# Patient Record
Sex: FEMALE | Race: WHITE | NOT HISPANIC OR LATINO | URBAN - METROPOLITAN AREA
[De-identification: names, ages, dates, MRNs, and addresses within clinical notes are randomized per-mention and may not be internally consistent; named-entity substitution may affect disease eponyms.]

---

## 2024-09-10 ENCOUNTER — HOSPITAL ENCOUNTER (OUTPATIENT)
Dept: RADIOLOGY | Facility: EXTERNAL LOCATION | Age: 79
Discharge: HOME | End: 2024-09-10

## 2024-09-16 ENCOUNTER — OFFICE VISIT (OUTPATIENT)
Dept: NEUROSURGERY | Facility: HOSPITAL | Age: 79
End: 2024-09-16
Payer: COMMERCIAL

## 2024-09-16 VITALS
HEART RATE: 66 BPM | WEIGHT: 104.06 LBS | RESPIRATION RATE: 17 BRPM | HEIGHT: 60 IN | BODY MASS INDEX: 20.43 KG/M2 | SYSTOLIC BLOOD PRESSURE: 130 MMHG | TEMPERATURE: 98.1 F | DIASTOLIC BLOOD PRESSURE: 71 MMHG

## 2024-09-16 DIAGNOSIS — I67.1 CEREBRAL ANEURYSM (HHS-HCC): Primary | ICD-10-CM

## 2024-09-16 PROCEDURE — 99202 OFFICE O/P NEW SF 15 MIN: CPT | Performed by: NURSE PRACTITIONER

## 2024-09-16 PROCEDURE — 99212 OFFICE O/P EST SF 10 MIN: CPT | Performed by: NURSE PRACTITIONER

## 2024-09-16 ASSESSMENT — ENCOUNTER SYMPTOMS
LOSS OF SENSATION IN FEET: 0
OCCASIONAL FEELINGS OF UNSTEADINESS: 1
DEPRESSION: 0

## 2024-09-16 NOTE — PROGRESS NOTES
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 Coiling was completed 27 years ago at [facility]. Last vascular imaging was completed in 2002 (read only) where she was noted to have coil artifact with lack of flow related enhancement in L ICA but noted a 1cm segment of flow in proximal ICA and 1.5cm segment flow in terminal ICA. No additional aneurysm noted at that time. She was recently evaluated by Dr. Negro for an elevate PTH of 135. Patient was recommended for CT/CTA imaging noted a parathyroid adenoma as well as a 5-6mm acomm aneurysm. She was recommended for neck exploration with probable removal of adenoma. Patient was referred to neurosurgery for surgical clearance and evaluation for acomm aneurysm. She presents to clinic for NPV.     Familial history of aneurysm: Brother passed from ruptured aneurysm.     Former smoker but quit over 50 years ago. BP controlled on current regimen. Intermittent headaches but are rare and only last 15-20 minutes. Denies any other neurological complaints such as blurred or double vision, seizures, or dizziness.         Objective      Vitals:   /71 (BP Location: Left arm, Patient Position: Sitting, BP Cuff Size: Adult)   Pulse 66   Temp 36.7 °C (98.1 °F)   Resp 17   Ht 1.524 m (5')   Wt 47.2 kg (104 lb 0.9 oz)   BMI 20.32 kg/m²         Physical Exam:  A&Ox3   Fluent speech   EOMI; FC x 4   MOFFETT; strength 5/5; no drift   Face and shoulder shrug symmetrical   SILT   Tongue midline   Gait slow, steady, use of a cane         Relevant Results:    CTA 08/22/24: 5mm acomm outpouching. Limited evaluated of L ICA by metal artifact.         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 Found to have 5mm acomm outpouching while undergoing workup for parathyroid adenoma. Known familial history of cerebral aneurysm with brother who passed from rupture.     I discussed the natural history of cerebral aneurysm and reviewed modifiable risk factors of ongoing BP management. She is a former smoker over 50 years ago. Given size and location will have case presented at CV conference due to patients age and need for surgical clearance.     I will call patient post conference with recommendations. All questions answered.     Medical History     No past medical history on file.  Past Surgical History:   Procedure Laterality Date    APPENDECTOMY  11/18/2016    Appendectomy    HYSTERECTOMY  11/18/2016    Hysterectomy    ROTATOR CUFF REPAIR  11/18/2016    Rotator Cuff Repair        No family history on file.  Not on File  No current outpatient medications

## 2024-09-18 ENCOUNTER — MULTIDISCIPLINARY MEETING (OUTPATIENT)
Dept: NEUROSURGERY | Facility: HOSPITAL | Age: 79
End: 2024-09-18
Payer: COMMERCIAL

## 2024-09-18 DIAGNOSIS — I67.1 CEREBRAL ANEURYSM (HHS-HCC): Primary | ICD-10-CM

## 2024-09-18 DIAGNOSIS — Z79.1 ENCOUNTER FOR MONITORING CHRONIC NSAID THERAPY: ICD-10-CM

## 2024-09-18 DIAGNOSIS — Z51.81 ENCOUNTER FOR MONITORING CHRONIC NSAID THERAPY: ICD-10-CM

## 2024-10-01 NOTE — PROGRESS NOTES
Cerebrovascular Conference 09/18/24    Case Review: PMH h/o back pain, osteoporosis, HTN, HLD, aortic valve sclerosis, GERD, colitis, hyperparathyroidism, parathyroid adenoma, 27 years ago had coiling of L opthalmic artery aneurysm and L ICA occlusion coils, on additional workup and clearance for neck exploration and removal of parathyroid adeoma was found to have Acomm aneurysm.     Familial history of rupture aneurysm - brother     Recommendations:   Angiogram for further evaluation of aneurysm. Ok to undergo surgical procedure for parathyroid adenoma.       Cerebrovascular conference is a multidisciplinary conferences attended by neurosurgery, neuro-radiology, APPs, and Rns.

## 2024-10-07 ENCOUNTER — LAB (OUTPATIENT)
Dept: LAB | Facility: LAB | Age: 79
End: 2024-10-07
Payer: COMMERCIAL

## 2024-10-07 DIAGNOSIS — Z51.81 ENCOUNTER FOR MONITORING CHRONIC NSAID THERAPY: ICD-10-CM

## 2024-10-07 DIAGNOSIS — Z79.1 ENCOUNTER FOR MONITORING CHRONIC NSAID THERAPY: ICD-10-CM

## 2024-10-07 DIAGNOSIS — I67.1 CEREBRAL ANEURYSM (HHS-HCC): ICD-10-CM

## 2024-10-07 LAB
ALBUMIN SERPL BCP-MCNC: 4.4 G/DL (ref 3.4–5)
ANION GAP SERPL CALC-SCNC: 11 MMOL/L (ref 10–20)
APTT PPP: 33 SECONDS (ref 27–38)
BUN SERPL-MCNC: 11 MG/DL (ref 6–23)
CALCIUM SERPL-MCNC: 10.1 MG/DL (ref 8.6–10.3)
CHLORIDE SERPL-SCNC: 106 MMOL/L (ref 98–107)
CO2 SERPL-SCNC: 28 MMOL/L (ref 21–32)
CREAT SERPL-MCNC: 0.52 MG/DL (ref 0.5–1.05)
EGFRCR SERPLBLD CKD-EPI 2021: >90 ML/MIN/1.73M*2
ERYTHROCYTE [DISTWIDTH] IN BLOOD BY AUTOMATED COUNT: 14.5 % (ref 11.5–14.5)
GLUCOSE SERPL-MCNC: 85 MG/DL (ref 74–99)
HCT VFR BLD AUTO: 43.1 % (ref 36–46)
HGB BLD-MCNC: 13.2 G/DL (ref 12–16)
INR PPP: 1 (ref 0.9–1.1)
MCH RBC QN AUTO: 29.9 PG (ref 26–34)
MCHC RBC AUTO-ENTMCNC: 30.6 G/DL (ref 32–36)
MCV RBC AUTO: 98 FL (ref 80–100)
NRBC BLD-RTO: 0 /100 WBCS (ref 0–0)
PHOSPHATE SERPL-MCNC: 3.7 MG/DL (ref 2.5–4.9)
PLATELET # BLD AUTO: 321 X10*3/UL (ref 150–450)
POTASSIUM SERPL-SCNC: 4.3 MMOL/L (ref 3.5–5.3)
PROTHROMBIN TIME: 11.4 SECONDS (ref 9.8–12.8)
RBC # BLD AUTO: 4.42 X10*6/UL (ref 4–5.2)
SODIUM SERPL-SCNC: 141 MMOL/L (ref 136–145)
WBC # BLD AUTO: 9 X10*3/UL (ref 4.4–11.3)

## 2024-10-07 PROCEDURE — 85730 THROMBOPLASTIN TIME PARTIAL: CPT

## 2024-10-07 PROCEDURE — 85610 PROTHROMBIN TIME: CPT

## 2024-10-07 PROCEDURE — 36415 COLL VENOUS BLD VENIPUNCTURE: CPT

## 2024-10-07 PROCEDURE — 85027 COMPLETE CBC AUTOMATED: CPT

## 2024-10-07 PROCEDURE — 80069 RENAL FUNCTION PANEL: CPT

## 2024-10-16 ENCOUNTER — HOSPITAL ENCOUNTER (OUTPATIENT)
Dept: RADIOLOGY | Facility: HOSPITAL | Age: 79
Discharge: HOME | End: 2024-10-16
Payer: COMMERCIAL

## 2024-10-16 VITALS
DIASTOLIC BLOOD PRESSURE: 68 MMHG | TEMPERATURE: 98.1 F | OXYGEN SATURATION: 98 % | HEART RATE: 65 BPM | RESPIRATION RATE: 17 BRPM | SYSTOLIC BLOOD PRESSURE: 125 MMHG

## 2024-10-16 DIAGNOSIS — I67.1 CEREBRAL ANEURYSM (HHS-HCC): ICD-10-CM

## 2024-10-16 PROCEDURE — 99152 MOD SED SAME PHYS/QHP 5/>YRS: CPT | Performed by: STUDENT IN AN ORGANIZED HEALTH CARE EDUCATION/TRAINING PROGRAM

## 2024-10-16 PROCEDURE — 2500000004 HC RX 250 GENERAL PHARMACY W/ HCPCS (ALT 636 FOR OP/ED): Performed by: STUDENT IN AN ORGANIZED HEALTH CARE EDUCATION/TRAINING PROGRAM

## 2024-10-16 PROCEDURE — 36222 PLACE CATH CAROTID/INOM ART: CPT | Mod: 50 | Performed by: STUDENT IN AN ORGANIZED HEALTH CARE EDUCATION/TRAINING PROGRAM

## 2024-10-16 PROCEDURE — 96373 THER/PROPH/DIAG INJ IA: CPT | Performed by: STUDENT IN AN ORGANIZED HEALTH CARE EDUCATION/TRAINING PROGRAM

## 2024-10-16 PROCEDURE — C1760 CLOSURE DEV, VASC: HCPCS | Performed by: STUDENT IN AN ORGANIZED HEALTH CARE EDUCATION/TRAINING PROGRAM

## 2024-10-16 PROCEDURE — C1769 GUIDE WIRE: HCPCS | Performed by: STUDENT IN AN ORGANIZED HEALTH CARE EDUCATION/TRAINING PROGRAM

## 2024-10-16 PROCEDURE — 36225 PLACE CATH SUBCLAVIAN ART: CPT

## 2024-10-16 PROCEDURE — 36226 PLACE CATH VERTEBRAL ART: CPT | Performed by: STUDENT IN AN ORGANIZED HEALTH CARE EDUCATION/TRAINING PROGRAM

## 2024-10-16 PROCEDURE — 76377 3D RENDER W/INTRP POSTPROCES: CPT | Performed by: STUDENT IN AN ORGANIZED HEALTH CARE EDUCATION/TRAINING PROGRAM

## 2024-10-16 PROCEDURE — 99153 MOD SED SAME PHYS/QHP EA: CPT | Performed by: STUDENT IN AN ORGANIZED HEALTH CARE EDUCATION/TRAINING PROGRAM

## 2024-10-16 PROCEDURE — 2550000001 HC RX 255 CONTRASTS: Performed by: STUDENT IN AN ORGANIZED HEALTH CARE EDUCATION/TRAINING PROGRAM

## 2024-10-16 PROCEDURE — 7100000009 HC PHASE TWO TIME - INITIAL BASE CHARGE: Performed by: STUDENT IN AN ORGANIZED HEALTH CARE EDUCATION/TRAINING PROGRAM

## 2024-10-16 PROCEDURE — 2720000007 HC OR 272 NO HCPCS: Performed by: STUDENT IN AN ORGANIZED HEALTH CARE EDUCATION/TRAINING PROGRAM

## 2024-10-16 PROCEDURE — 7100000010 HC PHASE TWO TIME - EACH INCREMENTAL 1 MINUTE: Performed by: STUDENT IN AN ORGANIZED HEALTH CARE EDUCATION/TRAINING PROGRAM

## 2024-10-16 PROCEDURE — 2780000003 HC OR 278 NO HCPCS: Performed by: STUDENT IN AN ORGANIZED HEALTH CARE EDUCATION/TRAINING PROGRAM

## 2024-10-16 PROCEDURE — 75710 ARTERY X-RAYS ARM/LEG: CPT | Mod: RT | Performed by: STUDENT IN AN ORGANIZED HEALTH CARE EDUCATION/TRAINING PROGRAM

## 2024-10-16 RX ORDER — ACETAMINOPHEN 160 MG/5ML
650 SOLUTION ORAL EVERY 4 HOURS PRN
Status: DISCONTINUED | OUTPATIENT
Start: 2024-10-16 | End: 2024-10-17 | Stop reason: HOSPADM

## 2024-10-16 RX ORDER — FENTANYL CITRATE 50 UG/ML
INJECTION, SOLUTION INTRAMUSCULAR; INTRAVENOUS
Status: COMPLETED | OUTPATIENT
Start: 2024-10-16 | End: 2024-10-16

## 2024-10-16 RX ORDER — ACETAMINOPHEN 325 MG/1
650 TABLET ORAL EVERY 4 HOURS PRN
Status: DISCONTINUED | OUTPATIENT
Start: 2024-10-16 | End: 2024-10-17 | Stop reason: HOSPADM

## 2024-10-16 RX ORDER — MIDAZOLAM HYDROCHLORIDE 1 MG/ML
INJECTION INTRAMUSCULAR; INTRAVENOUS
Status: COMPLETED | OUTPATIENT
Start: 2024-10-16 | End: 2024-10-16

## 2024-10-16 RX ORDER — ACETAMINOPHEN 650 MG/1
650 SUPPOSITORY RECTAL EVERY 4 HOURS PRN
Status: DISCONTINUED | OUTPATIENT
Start: 2024-10-16 | End: 2024-10-17 | Stop reason: HOSPADM

## 2024-10-16 ASSESSMENT — PAIN SCALES - GENERAL
PAINLEVEL_OUTOF10: 0 - NO PAIN
PAINLEVEL_OUTOF10: 4
PAINLEVEL_OUTOF10: 0 - NO PAIN

## 2024-10-16 ASSESSMENT — PAIN - FUNCTIONAL ASSESSMENT
PAIN_FUNCTIONAL_ASSESSMENT: 0-10

## 2024-10-16 NOTE — PRE-PROCEDURE NOTE
Pre-Procedure H&P     Provider Assessment:  Diagnosis/Reason for Procedure: Cerebral Aneurysm   Procedure: Diagnostic Cerebral Angiogram  Medications Reviewed:   yes   Prophylatic Antibiotics Needed:   no    Neuro status: A&Ox3, moving all extremities full strength   Mouth Opening OK: yes   Neck Flexibility OK: yes   Sedation Plan: moderate sedation   COVID-19 Risk Consent:  Surgeon has reviewed key risks related to the risk of rakan COVID-19 and if they contract COVID-19 what the risks are.

## 2024-10-16 NOTE — Clinical Note
Patient called advised by pharmacy,ergocalciferol (VITAMIN D2) 50,000 units , and etodolac (LODINE) 400 MG tablet Dr. Hampton  must sign off on the prescriptions. Patient request a refill of Both medications. Please advise Patient at [phone], if and when prescriptions have been to submitted to patients pharmacy [id number] - [place]  [address]    The site was marked. Prepped: right groin. Prepped with: ChloraPrep. The patient was draped.

## 2024-10-16 NOTE — PROCEDURES
Pre-Procedure Verification and Time Out:  Procedure Location: procedure area  HUDDLE - Pre-procedure Verification:  completed  TIME OUT - Final Verification:  completed immediately prior to procedure start  DEBRIEF: completed    Complications:  None; patient tolerated the procedure well.     Disposition: rPCU  Condition: stable  Specimens Collected: No specimens collected    General Information:   Anesthesia/ sedation: Non-Anesthesia  Indication(s)/Pre - Procedure Diagnoses: intracranial aneurysm  Post-Procedure Diagnosis: same  Procedure Name: Diagnostic Cerebral Angiogram  Procedure performed by: Dr. Helen Jamil  Assistant(s): Alexandru Castellon  Estimated Blood Loss (mL): 10  Specimen: no  Informed Consent: consent obtained and in chart     Access: 5 Fr Sheath in R CFA  Closure: Mynx  Vessels Injected: L subclavian, R subclavian, L CCA, R CCA, R CFA  Moderate Sedation Time: 45min  Findings: 5.5mm acomm aneurysm. Previously coiled L paraophthalmic ICA aneurysm with no residual filling, coil sacrifice of L ICA with complete occlusion distally. Full report to follow in PACS dictation

## 2024-10-23 ENCOUNTER — MULTIDISCIPLINARY MEETING (OUTPATIENT)
Dept: NEUROSURGERY | Facility: HOSPITAL | Age: 79
End: 2024-10-23
Payer: COMMERCIAL

## 2024-10-28 ENCOUNTER — OFFICE VISIT (OUTPATIENT)
Dept: NEUROSURGERY | Facility: HOSPITAL | Age: 79
End: 2024-10-28
Payer: COMMERCIAL

## 2024-10-28 VITALS
RESPIRATION RATE: 15 BRPM | WEIGHT: 104.06 LBS | HEIGHT: 60 IN | BODY MASS INDEX: 20.43 KG/M2 | SYSTOLIC BLOOD PRESSURE: 153 MMHG | DIASTOLIC BLOOD PRESSURE: 66 MMHG | HEART RATE: 63 BPM | TEMPERATURE: 97.3 F

## 2024-10-28 DIAGNOSIS — I67.1 CEREBRAL ANEURYSM (HHS-HCC): Primary | ICD-10-CM

## 2024-10-28 PROCEDURE — 99214 OFFICE O/P EST MOD 30 MIN: CPT | Performed by: NEUROLOGICAL SURGERY

## 2024-10-28 PROCEDURE — 1126F AMNT PAIN NOTED NONE PRSNT: CPT | Performed by: NEUROLOGICAL SURGERY

## 2024-10-28 PROCEDURE — 99214 OFFICE O/P EST MOD 30 MIN: CPT | Mod: 57 | Performed by: NEUROLOGICAL SURGERY

## 2024-10-28 ASSESSMENT — PAIN SCALES - GENERAL: PAINLEVEL_OUTOF10: 0-NO PAIN

## 2024-11-11 ENCOUNTER — PREP FOR PROCEDURE (OUTPATIENT)
Dept: NEUROSURGERY | Facility: HOSPITAL | Age: 79
End: 2024-11-11
Payer: COMMERCIAL

## 2024-11-11 DIAGNOSIS — I67.1 CEREBRAL ANEURYSM (HHS-HCC): Primary | ICD-10-CM

## 2024-11-22 ENCOUNTER — PRE-ADMISSION TESTING (OUTPATIENT)
Dept: PREADMISSION TESTING | Facility: HOSPITAL | Age: 79
End: 2024-11-22
Payer: COMMERCIAL

## 2024-11-22 VITALS
WEIGHT: 98 LBS | RESPIRATION RATE: 16 BRPM | DIASTOLIC BLOOD PRESSURE: 61 MMHG | TEMPERATURE: 97.3 F | BODY MASS INDEX: 21.14 KG/M2 | SYSTOLIC BLOOD PRESSURE: 150 MMHG | HEART RATE: 60 BPM | HEIGHT: 57 IN

## 2024-11-22 DIAGNOSIS — I67.1 CEREBRAL ANEURYSM (HHS-HCC): Primary | ICD-10-CM

## 2024-11-22 LAB
ABO GROUP (TYPE) IN BLOOD: NORMAL
ANION GAP SERPL CALC-SCNC: 14 MMOL/L (ref 10–20)
ANTIBODY SCREEN: NORMAL
BUN SERPL-MCNC: 8 MG/DL (ref 6–23)
CALCIUM SERPL-MCNC: 9.9 MG/DL (ref 8.6–10.6)
CHLORIDE SERPL-SCNC: 107 MMOL/L (ref 98–107)
CO2 SERPL-SCNC: 25 MMOL/L (ref 21–32)
CREAT SERPL-MCNC: 0.55 MG/DL (ref 0.5–1.05)
EGFRCR SERPLBLD CKD-EPI 2021: >90 ML/MIN/1.73M*2
ERYTHROCYTE [DISTWIDTH] IN BLOOD BY AUTOMATED COUNT: 13.5 % (ref 11.5–14.5)
GLUCOSE SERPL-MCNC: 89 MG/DL (ref 74–99)
HCT VFR BLD AUTO: 42.6 % (ref 36–46)
HGB BLD-MCNC: 13.1 G/DL (ref 12–16)
MCH RBC QN AUTO: 29.8 PG (ref 26–34)
MCHC RBC AUTO-ENTMCNC: 30.8 G/DL (ref 32–36)
MCV RBC AUTO: 97 FL (ref 80–100)
NRBC BLD-RTO: 0 /100 WBCS (ref 0–0)
PLATELET # BLD AUTO: 299 X10*3/UL (ref 150–450)
POTASSIUM SERPL-SCNC: 4.7 MMOL/L (ref 3.5–5.3)
RBC # BLD AUTO: 4.4 X10*6/UL (ref 4–5.2)
RH FACTOR (ANTIGEN D): NORMAL
SODIUM SERPL-SCNC: 141 MMOL/L (ref 136–145)
WBC # BLD AUTO: 7.5 X10*3/UL (ref 4.4–11.3)

## 2024-11-22 PROCEDURE — 36415 COLL VENOUS BLD VENIPUNCTURE: CPT

## 2024-11-22 PROCEDURE — 99204 OFFICE O/P NEW MOD 45 MIN: CPT | Performed by: NURSE PRACTITIONER

## 2024-11-22 PROCEDURE — 86901 BLOOD TYPING SEROLOGIC RH(D): CPT

## 2024-11-22 PROCEDURE — 82374 ASSAY BLOOD CARBON DIOXIDE: CPT

## 2024-11-22 PROCEDURE — 87081 CULTURE SCREEN ONLY: CPT

## 2024-11-22 PROCEDURE — 85027 COMPLETE CBC AUTOMATED: CPT

## 2024-11-22 PROCEDURE — 86923 COMPATIBILITY TEST ELECTRIC: CPT

## 2024-11-22 RX ORDER — ATORVASTATIN CALCIUM 10 MG/1
10 TABLET, FILM COATED ORAL DAILY
Status: ON HOLD | COMMUNITY

## 2024-11-22 RX ORDER — FAMOTIDINE 20 MG/1
20 TABLET, FILM COATED ORAL 2 TIMES DAILY
Status: ON HOLD | COMMUNITY

## 2024-11-22 RX ORDER — CHLORHEXIDINE GLUCONATE 40 MG/ML
SOLUTION TOPICAL DAILY PRN
Qty: 473 ML | Refills: 0 | Status: ON HOLD | OUTPATIENT
Start: 2024-11-22 | End: 2024-11-29 | Stop reason: ALTCHOICE

## 2024-11-22 RX ORDER — LOSARTAN POTASSIUM 50 MG/1
50 TABLET ORAL DAILY
Status: ON HOLD | COMMUNITY

## 2024-11-22 RX ORDER — TRIMETHOPRIM 100 MG/1
100 TABLET ORAL DAILY
Status: ON HOLD | COMMUNITY

## 2024-11-22 RX ORDER — TAMSULOSIN HYDROCHLORIDE 0.4 MG/1
0.4 CAPSULE ORAL DAILY
Status: ON HOLD | COMMUNITY

## 2024-11-22 RX ORDER — FLUTICASONE PROPIONATE AND SALMETEROL XINAFOATE 230; 21 UG/1; UG/1
1 AEROSOL, METERED RESPIRATORY (INHALATION)
Status: ON HOLD | COMMUNITY

## 2024-11-22 RX ORDER — BUDESONIDE 3 MG/1
3 CAPSULE, COATED PELLETS ORAL EVERY MORNING
COMMUNITY
End: 2024-11-27 | Stop reason: ENTERED-IN-ERROR

## 2024-11-22 RX ORDER — FLUTICASONE PROPIONATE AND SALMETEROL XINAFOATE 45; 21 UG/1; UG/1
1 AEROSOL, METERED RESPIRATORY (INHALATION)
COMMUNITY
End: 2024-11-27 | Stop reason: ENTERED-IN-ERROR

## 2024-11-22 RX ORDER — CHLORHEXIDINE GLUCONATE ORAL RINSE 1.2 MG/ML
15 SOLUTION DENTAL AS NEEDED
Qty: 473 ML | Refills: 0 | Status: ON HOLD | OUTPATIENT
Start: 2024-11-22 | End: 2024-11-29 | Stop reason: ALTCHOICE

## 2024-11-22 RX ORDER — COLESEVELAM 180 1/1
1875 TABLET ORAL
Status: ON HOLD | COMMUNITY

## 2024-11-22 RX ORDER — DORZOLAMIDE HYDROCHLORIDE AND TIMOLOL MALEATE 20; 5 MG/ML; MG/ML
1 SOLUTION/ DROPS OPHTHALMIC 2 TIMES DAILY
COMMUNITY
End: 2024-11-27 | Stop reason: ENTERED-IN-ERROR

## 2024-11-22 RX ORDER — CARVEDILOL 12.5 MG/1
12.5 TABLET ORAL 2 TIMES DAILY
Status: ON HOLD | COMMUNITY

## 2024-11-22 ASSESSMENT — DUKE ACTIVITY SCORE INDEX (DASI)
CAN YOU PARTICIPATE IN MODERATE RECREATIONAL ACTIVITIES LIKE GOLF, BOWLING, DANCING, DOUBLES TENNIS OR THROWING A BASEBALL OR FOOTBALL: NO
CAN YOU DO HEAVY WORK AROUND THE HOUSE LIKE SCRUBBING FLOORS OR LIFTING AND MOVING HEAVY FURNITURE: YES
DASI METS SCORE: 7.6
TOTAL_SCORE: 39.45
CAN YOU RUN A SHORT DISTANCE: YES
CAN YOU WALK INDOORS, SUCH AS AROUND YOUR HOUSE: YES
CAN YOU DO YARD WORK LIKE RAKING LEAVES, WEEDING OR PUSHING A MOWER: YES
CAN YOU TAKE CARE OF YOURSELF (EAT, DRESS, BATHE, OR USE TOILET): YES
CAN YOU DO LIGHT WORK AROUND THE HOUSE LIKE DUSTING OR WASHING DISHES: YES
CAN YOU HAVE SEXUAL RELATIONS: NO
CAN YOU CLIMB A FLIGHT OF STAIRS OR WALK UP A HILL: YES
CAN YOU WALK A BLOCK OR TWO ON LEVEL GROUND: YES
CAN YOU PARTICIPATE IN STRENOUS SPORTS LIKE SWIMMING, SINGLES TENNIS, FOOTBALL, BASKETBALL, OR SKIING: NO
CAN YOU DO MODERATE WORK AROUND THE HOUSE LIKE VACUUMING, SWEEPING FLOORS OR CARRYING GROCERIES: YES

## 2024-11-22 ASSESSMENT — ENCOUNTER SYMPTOMS
CONSTITUTIONAL NEGATIVE: 1
GASTROINTESTINAL NEGATIVE: 1
ENDOCRINE NEGATIVE: 1
NEUROLOGICAL NEGATIVE: 1
EYES NEGATIVE: 1
CARDIOVASCULAR NEGATIVE: 1
NECK NEGATIVE: 1
RESPIRATORY NEGATIVE: 1
ARTHRALGIAS: 1
BRUISES/BLEEDS EASILY: 1

## 2024-11-22 ASSESSMENT — LIFESTYLE VARIABLES: SMOKING_STATUS: NONSMOKER

## 2024-11-22 NOTE — PREPROCEDURE INSTRUCTIONS
Fasting Guidelines    NPO Instructions:    Do not eat any food after midnight the night before your surgery/procedure.  You may have up to TEN ounces of clear liquids until TWO hours before your instructed arrival time to the hospital. This includes water, black tea/coffee, (no milk or cream), apple juice, and/or electrolyte drinks (Gatorade).  You may chew gum up to TWO hours before your surgery/procedure.    Additional Instructions:     We have sent a prescription for Hibiclens soap and Peridex mouth wash to your preferred pharmacy.  If you have not already, Please  your prescription and start using as directed before surgery.  Follow the instruction sheet provided to you at your CPM/PAT appointment.    Avoid herbal supplements, multivitamins and NSAIDS (non-steroidal anti-inflammatory drugs) such as Advil, Aleve, Ibuprofen, Naproxen, Excedrin, Meloxicam or Celebrex for at least 7 days prior to surgery. May take Tylenol as needed.    Avoid tobacco and alcohol products for 24 hours prior to surgery.    CONTACT SURGEON'S OFFICE IF YOU DEVELOP:  * Fever = 100.4 F   * New respiratory symptoms (e.g. cough, shortness of breath, respiratory distress, sore throat)  * Recent loss of taste or smell  *Flu like symptoms such as headache, fatigue or gastrointestinal symptoms  * You develop any open sores, shingles, burning or painful urination   AND/OR:  * You no longer wish to have the surgery.  * Any other personal circumstances change that may lead to the need to cancel or defer this surgery.  *You were admitted to any hospital within one week of your planned procedure.    Seven/Six Days before Surgery:  Review your medication instructions, stop indicated medications    Day of Surgery:  Review your medication instructions, take indicated medications  Wear comfortable loose fitting clothing  Do not use moisturizers, creams, lotions or perfume  All jewelry and valuables should be left at home      [facility]  [phone]           Patient Information: Pre-Operative Infection Prevention Measures     Why did I have my nose, under my arms, and groin swabbed?  The purpose of the swab is to identify Staphylococcus aureus inside your nose or on your skin.  The swab was sent to the laboratory for culture.  A positive swab/culture for Staphylococcus aureus is called colonization or carriage.      What is Staphylococcus aureus?  Staphylococcus aureus, also known as “staph”, is a germ found on the skin or in the nose of healthy people.  Sometimes Staphylococcus aureus can get into the body and cause an infection.  This can be minor (such as pimples, boils, or other skin problems).  It might also be serious (such as a blood infection, pneumonia, or a surgical site infection).    What is Staphylococcus aureus colonization or carriage?  Colonization or carriage means that a person has the germ but is not sick from it.  These bacteria can be spread on the hands or when breathing or sneezing.    How is Staphylococcus aureus spread?  It is most often spread by close contact with a person or item that carries it.    What happens if my culture is positive for Staphylococcus aureus?  Your doctor/medical team will use this information to guide any antibiotic treatment which may be necessary.  Regardless of the culture results, we will clean the inside of your nose with a betadine swab just before you have your surgery.      Will I get an infection if I have Staphylococcus aureus in my nose or on my skin?  Anyone can get an infection with Staphylococcus aureus.  However, the best way to reduce your risk of infection is to follow the instructions provided to you for the use of your CHG soap and dental rinse.        Patient Information: Oral/Dental Rinse    What is oral/dental rinse?   It is a mouthwash. It is a way of cleaning the mouth with a germ-killing solution before your surgery.  The solution contains  chlorhexidine, commonly known as CHG.   It is used inside the mouth to kill a bacteria known as Staphylococcus aureus.  Let your doctor know if you are allergic to Chlorhexidine.    Why do I need to use CHG oral/dental rinse?  The CHG oral/dental rinse helps to kill a bacteria in your mouth known as Staphylococcus aureus.     This reduces the risk of infection at the surgical site.      Using your CHG oral/dental rinse  STEPS:  Use your CHG oral/dental rinse after you brush your teeth the night before (at bedtime) and the morning of your surgery.  Follow all directions on your prescription label.    Use the cap on the container to measure 15ml   Swish (gargle if you can) the mouthwash in your mouth for at least 30 seconds, (do not swallow) and spit out  After you use your CHG rinse, do not rinse your mouth with water, drink or eat.  Please refer to the prescription label for the appropriate time to resume oral intake      What side effects might I have using the CHG oral/dental rinse?  CHG rinse will stick to plaque on the teeth.  Brush and floss just before use.  Teeth brushing will help avoid staining of plaque during use.      Patient Information: Home Preoperative Antibacterial Shower      What is a home preoperative antibacterial shower?  This shower is a way of cleaning the skin with a germ-killing solution before surgery.  The solution contains chlorhexidine, commonly known as CHG.  CHG is a skin cleanser with germ-killing ability.  Let your doctor know if you are allergic to chlorhexidine.    Why do I need to take a preoperative antibacterial shower?  Skin is not sterile.  It is best to try to make your skin as free of germs as possible before surgery.  Proper cleansing with a germ-killing soap before surgery can lower the number of germs on your skin.  This helps to reduce the risk of infection at the surgical site.  Following the instructions listed below will help you prepare your skin for surgery.       How do I use the solution?  Steps:  Begin using your CHG soap 5 days before your scheduled surgery on ________________________.    First, wash and rinse your hair using the CHG soap. Keep CHG soap away from ear canals and eyes.  Rinse completely, do not condition.  Hair extensions should be removed.  Wash your face with your normal soap and rinse.    Apply the CHG solution to a clean wet washcloth.  Turn the water off or move away from the water spray to avoid premature rinsing of the CHG soap as you are applying.   Firmly lather your entire body from the neck down.  Do not use on your face.  Pay special attention to the area(s) where your incision(s) will be located unless they are on your face.  Avoid scrubbing your skin too hard.  The important point is to have the CHG soap sit on your skin for 3 minutes.    When the 3 minutes are up, turn on the water and rinse the CHG solution off your body completely.   DO NOT wash with regular soap after you have used the CHG soap solution  Pat yourself dry with a clean, freshly-laundered towel.  DO NOT apply powders, deodorants, or lotions.  Dress in clean, freshly laundered nightclothes.    Be sure to sleep with clean, freshly laundered sheets.  Be aware that CHG will cause stains on fabrics; if you wash them with bleach after use.  Rinse your washcloth and other linens that have contact with CHG completely.  Use only non-chlorine detergents to launder the items used.   The morning of surgery is the fifth day.  Repeat the above steps and dress in clean comfortable clothing     Whom should I contact if I have any questions regarding the use of CHG soap?  Call the [facility], [facility] at [phone] if you have any questions.               Preoperative Brain Exercises    What are brain exercises?  A brain exercise is any activity that engages your thinking (cognitive) skills.    What types of activities are  considered brain exercises?  Jigsaw puzzles, crossword puzzles, word jumble, memory games, word search, and many more.  Many can be found free online or on your phone via a mobile kevin.    Why should I do brain exercises before my surgery?  More recent research has shown brain exercise before surgery can lower the risk of postoperative delirium (confusion) which can be especially important for older adults.  Patients who did brain exercises for 5 to 10 hours the days before surgery, cut their risk of postoperative delirium in half up to 1 week after surgery.         [facility]    Preoperative Deep Breathing Exercises    Why it is important to do deep breathing exercises before my surgery?  Deep breathing exercises strengthen your breathing muscles.  This helps you to recover after your surgery and decreases the chance of breathing complications.      How are the deep breathing exercises done?  Sit straight with your back supported.  Breathe in deeply and slowly through your nose. Your lower rib cage should expand and your abdomen may move forward.  Hold that breath for 3 to 5 seconds.  Breathe out through pursed lips, slowly and completely.  Rest and repeat 10 times every hour while awake.  Rest longer if you become dizzy or lightheaded.         Patient and Family Education             Ways You Can Help Prevent Blood Clots             This handout explains some simple things you can do to help prevent blood clots.      Blood clots are blockages that can form in the body's veins. When a blood clot forms in your deep veins, it may be called a deep vein thrombosis, or DVT for short. Blood clots can happen in any part of the body where blood flows, but they are most common in the arms and legs. If a piece of a blood clot breaks free and travels to the lungs, it is called a pulmonary embolus (PE). A PE can be a very serious problem.         Being in the hospital or having surgery can raise your  chances of getting a blood clot because you may not be well enough to move around as much as you normally do.         Ways you can help prevent blood clots in the hospital         Wearing SCDs. SCDs stands for Sequential Compression Devices.   SCDs are special sleeves that wrap around your legs  They attach to a pump that fills them with air to gently squeeze your legs every few minutes.   This helps return the blood in your legs to your heart.   SCDs should only be taken off when walking or bathing.   SCDs may not be comfortable, but they can help save your life.               Wearing compression stockings - if your doctor orders them. These special snug fitting stockings gently squeeze your legs to help blood flow.       Walking. Walking helps move the blood in your legs.   If your doctor says it is ok, try walking the halls at least   5 times a day. Ask us to help you get up, so you don't fall.      Taking any blood thinning medicines your doctor orders.        Page 1 of 2     [facility]; 3/23   Ways you can help prevent blood clots at home       Wearing compression stockings - if your doctor orders them. ? Walking - to help move the blood in your legs.       Taking any blood thinning medicines your doctor orders.      Signs of a blood clot or PE      Tell your doctor or nurse know right away if you have of the problems listed below.    If you are at home, seek medical care right away. Call 911 for chest pain or problems breathing.               Signs of a blood clot (DVT) - such as pain,  swelling, redness or warmth in your arm or leg      Signs of a pulmonary embolism (PE) - such as chest     pain or feeling short of breath

## 2024-11-22 NOTE — H&P (VIEW-ONLY)
[facility] Evaluation       Name: Cristina Rivera (Cristina Rivera)  /Age: 1945/ y.o.     Visit Type:   In-Person       Chief Complaint: Cerebral aneurysm (HHS-HCC)    HPI  Pt is a 79 year old female with a PMHx significant for HTN, HLD, aortic valve sclerosis, asthma, glaucoma, cataracts, GERD, ulcerative colitis, anemia, UTIs, osteoarthritis and an ACOMM aneurysm.  Patient is being evaluated in [facility] in anticipation of a right craniotomy for aneurysmal clipping with Dr. Mc on 24.   Past Surgical History:   Procedure Laterality Date    APPENDECTOMY  2016    Appendectomy    HYSTERECTOMY  2016    Hysterectomy    ROTATOR CUFF REPAIR  2016    Rotator Cuff Repair     Allergies   Allergen Reactions    Codeine GI Upset     PAT ROS:   Constitutional:   neg    Neuro/Psych:   neg    Eyes:   neg    Ears:   neg    Nose:   neg    Mouth:   neg    Throat:   neg    Neck:   neg    Cardio:   neg    Respiratory:   neg    Endocrine:   neg    GI:   neg    :   neg    Musculoskeletal:    arthralgias  Hematologic:    bruises/bleeds easily  Skin:  neg        Physical Exam  Vitals reviewed.   Constitutional:       Appearance: Normal appearance.   HENT:      Head: Normocephalic and atraumatic.      Nose: Nose normal.      Mouth/Throat:      Mouth: Mucous membranes are moist.   Cardiovascular:      Rate and Rhythm: Normal rate and regular rhythm.      Pulses: Normal pulses.      Heart sounds: Normal heart sounds.   Pulmonary:      Effort: Pulmonary effort is normal.      Breath sounds: Normal breath sounds.   Abdominal:      Palpations: Abdomen is soft.   Musculoskeletal:         General: Normal range of motion.      Cervical back: Normal range of motion.   Skin:     General: Skin is warm.   Neurological:      General: No focal deficit present.      Mental Status: She is alert and oriented to person, place, and time.      Gait: Gait abnormal (ambulates with cane).   Psychiatric:         Mood and Affect:  Mood normal.         Behavior: Behavior normal.          PAT AIRWAY:   Airway:     Mallampati::  II    TM distance::  >3 FB    Neck ROM::  Full  normal        Testing/Diagnostic:     Patient Specialist/PCP:     Visit Vitals  /61   Pulse 60   Temp 36.3 °C (97.3 °F)       DASI Risk Score      Flowsheet Row Pre-Admission Testing from 11/22/2024 in [facility]   Can you take care of yourself (eat, dress, bathe, or use toilet)?  2.75 filed at 11/22/2024 0838   Can you walk indoors, such as around your house? 1.75 filed at 11/22/2024 0838   Can you walk a block or two on level ground?  2.75 filed at 11/22/2024 0838   Can you climb a flight of stairs or walk up a hill? 5.5 filed at 11/22/2024 0838   Can you run a short distance? 8 filed at 11/22/2024 0838   Can you do light work around the house like dusting or washing dishes? 2.7 filed at 11/22/2024 0838   Can you do moderate work around the house like vacuuming, sweeping floors or carrying groceries? 3.5 filed at 11/22/2024 0838   Can you do heavy work around the house like scrubbing floors or lifting and moving heavy furniture?  8 filed at 11/22/2024 0838   Can you do yard work like raking leaves, weeding or pushing a mower? 4.5 filed at 11/22/2024 0838   Can you have sexual relations? 0 filed at 11/22/2024 0838   Can you participate in moderate recreational activities like golf, bowling, dancing, doubles tennis or throwing a baseball or football? 0 filed at 11/22/2024 0838   Can you participate in strenous sports like swimming, singles tennis, football, basketball, or skiing? 0 filed at 11/22/2024 0838   DASI SCORE 39.45 filed at 11/22/2024 0838   METS Score (Will be calculated only when all the questions are answered) 7.6 filed at 11/22/2024 0838          Caprini DVT Assessment    No data to display       Modified Frailty Index    No data to display       CHADS2 Stroke Risk  Current as of 13 minutes ago        N/A 3 to 100%: High Risk   2 to <  3%: Medium Risk   0 to < 2%: Low Risk     Last Change: N/A          This score determines the patient's risk of having a stroke if the patient has atrial fibrillation.        This score is not applicable to this patient. Components are not calculated.          Revised Cardiac Risk Index    No data to display       Apfel Simplified Score    No data to display       Risk Analysis Index Results This Encounter    No data found in the last 10 encounters.       Stop Bang Score      Flowsheet Row Pre-Admission Testing from 11/22/2024 in [facility]   Do you snore loudly? 0 filed at 11/22/2024 0839   Do you often feel tired or fatigued after your sleep? 1 filed at 11/22/2024 0839   Has anyone ever observed you stop breathing in your sleep? 0 filed at 11/22/2024 0839   Do you have or are you being treated for high blood pressure? 1 filed at 11/22/2024 0839   Recent BMI (Calculated) 21.2 filed at 11/22/2024 0839   Is BMI greater than 35 kg/m2? 0=No filed at 11/22/2024 0839   Age older than 50 years old? 1=Yes filed at 11/22/2024 0839   Is your neck circumference greater than 17 inches (Male) or 16 inches (Female)? 0 filed at 11/22/2024 0839   Gender - Male 0=No filed at 11/22/2024 0839   STOP-BANG Total Score 3 filed at 11/22/2024 0839          Prodigy: High Risk  Total Score: 12              Prodigy Age Score           ARISCAT Score for Postoperative Pulmonary Complications    No data to display       [place] Perioperative Risk for Myocardial Infarction or Cardiac Arrest (CHELSIE)    No data to display         Assessment and Plan:     Anesthesia  The patient denies problems with anesthesia in the past such as PONV, prolonged sedation, awareness, dental damage, aspiration, cardiac arrest, difficult intubation, or unexpected hospital admissions.     Neurology  The patient has an cerebral aneurysm. The patient is at increased risk for postoperative delirium secondary to age 65 or older. The patient is at increased  risk for perioperative stroke secondary to hypertension , increased age, hyperlipidemia, female gender, general anesthesia, operative time >2.5 hours. Handouts for preoperative brain exercises given to patient.    HEENT/Airway  No diagnoses, significant findings on chart review, clinical presentation, or evaluation. No documented or reported history of airway difficulty.     Cardiovascular  The patient is scheduled for non-cardiac surgery associated with elevated risk. The patient has no major cardiac contraindications to non- cardiac surgery.  RCRI  The patient meets 0 RCRI criteria and therefor has a 3.9% risk of major adverse cardiac complications.  METS  The patient's functional capacity capacity is greater than 4 METS.  EKG  The patient has no EKG or echocardiographic changes concerning for myocardial ischemia.   Heart Failure  The patient has no known history of heart failure.  Additionally, the patient reports no symptoms of heart failure and demonstrates no signs of heart failure.  Hypertension Evaluation  The patient has a known history of hypertension that is controlled.  Patient's hypertension is most consistent with stage 1.  Heart Rhythm Evaluation  The patient has no history of arrhythmias.  Heart Valve Evaluation  The patient has a known history of valvular heart disease.  Per patient's prior studies, the patient has aortic valve sclerosis  Cardiology Evaluation  The patient follows with cardiology, Dr. Hagen. Patient was last seen 10-18-23. Per note,   Pt is low cardiac risk  The patient has a 30-day risk for MACE of 0 predictors, 3.9% risk for cardiac death, nonfatal myocardial infarction, and nonfatal cardiac arrest.  CHELSIE score which indicates a 0.4% risk of intraoperative or 30-day postoperative MACE (major adverse cardiac event).    Pulmonary  The patient has asthma. The patient is at increased risk of perioperative pulmonary complications secondary to neurosurgery, advanced age greater than  60.    The patient has a stop bang score of 3, which places patient at intermediate risk for having DEONTE.    ARISCAT 26, Intermediate, 13.3% risk of in-hospital postoperative pulmonary complications  PRODIGY 12, intermediate risk of respiratory depression episode. Patient given PI sheet for preoperative deep breathing exercises.    Hematology  No diagnoses or significant findings on chart review or clinical presentation and evaluation.  Antiplatelet management   The patient is not currently receiving antiplatelet therapy.  Anticoagulation management  The patient is not currently receiving anticoagulation therapy.    Caprini score 10, high risk of perioperative VTE.     Patient instructed to ambulate as soon as possible postoperatively to decrease thromboembolic risk. Initiate mechanical DVT prophylaxis as soon as possible and initiate chemical prophylaxis when deemed safe from a bleeding standpoint post surgery.     Transfusion Evaluation  A type and screen was obtained given the likelihood for perioperative transfusion of blood or blood products.    Gastrointestinal  The patient has GERD    Eat 10- 0,  self-perceived oropharyngeal dysphagia scale (0-40)     Genitourinary  The patient has h/o UTI    Renal  No renal diagnoses or significant findings on chart review or clinical presentation and evaluation.    Musculoskeletal  The patient has osteoarthritis    Endocrine  Diabetes Evaluation  The patient has no history of diabetes mellitus.  Thyroid Disease Evaluation  The patient has a history of thyroid disease that appears controlled.    ID  No diagnoses or significant findings on chart review or clinical presentation and evaluation. MRSA screening obtained. Prescriptions and instructions given for Hibiclens and Peridex.    -Preoperative medication instructions were provided and reviewed with the patient.  Any additional testing or evaluation was explained to the patient.  NPO Instructions were discussed, and the  patient's questions were answered prior to conclusion of this encounter. Patient verbalized understanding of preoperative instructions. After Visit Summary given.      Recent Results (from the past week)   Staphylococcus aureus/MRSA colonization, Culture    Collection Time: 11/22/24  9:08 AM    Specimen: Nares/Axilla/Groin; Swab   Result Value Ref Range    Staph/MRSA Screen Culture No Staphylococcus aureus isolated    CBC    Collection Time: 11/22/24  9:08 AM   Result Value Ref Range    WBC 7.5 4.4 - 11.3 x10*3/uL    nRBC 0.0 0.0 - 0.0 /100 WBCs    RBC 4.40 4.00 - 5.20 x10*6/uL    Hemoglobin 13.1 12.0 - 16.0 g/dL    Hematocrit 42.6 36.0 - 46.0 %    MCV 97 80 - 100 fL    MCH 29.8 26.0 - 34.0 pg    MCHC 30.8 (L) 32.0 - 36.0 g/dL    RDW 13.5 11.5 - 14.5 %    Platelets 299 150 - 450 x10*3/uL   Basic Metabolic Panel    Collection Time: 11/22/24  9:08 AM   Result Value Ref Range    Glucose 89 74 - 99 mg/dL    Sodium 141 136 - 145 mmol/L    Potassium 4.7 3.5 - 5.3 mmol/L    Chloride 107 98 - 107 mmol/L    Bicarbonate 25 21 - 32 mmol/L    Anion Gap 14 10 - 20 mmol/L    Urea Nitrogen 8 6 - 23 mg/dL    Creatinine 0.55 0.50 - 1.05 mg/dL    eGFR >90 >60 mL/min/1.73m*2    Calcium 9.9 8.6 - 10.6 mg/dL   Type And Screen    Collection Time: 11/22/24  9:08 AM   Result Value Ref Range    ABO TYPE A     Rh TYPE POS     ANTIBODY SCREEN NEG

## 2024-11-22 NOTE — CPM/PAT H&P
[facility] Evaluation       Name: Cristina Rivera (Cristina Rivera)  /Age: 1945/ y.o.     Visit Type:   In-Person       Chief Complaint: Cerebral aneurysm (HHS-HCC)    HPI  Pt is a 79 year old female with a PMHx significant for HTN, HLD, aortic valve sclerosis, asthma, glaucoma, cataracts, GERD, ulcerative colitis, anemia, UTIs, osteoarthritis and an ACOMM aneurysm.  Patient is being evaluated in [facility] in anticipation of a right craniotomy for aneurysmal clipping with Dr. Mc on 24.   Past Surgical History:   Procedure Laterality Date    APPENDECTOMY  2016    Appendectomy    HYSTERECTOMY  2016    Hysterectomy    ROTATOR CUFF REPAIR  2016    Rotator Cuff Repair     Allergies   Allergen Reactions    Codeine GI Upset     PAT ROS:   Constitutional:   neg    Neuro/Psych:   neg    Eyes:   neg    Ears:   neg    Nose:   neg    Mouth:   neg    Throat:   neg    Neck:   neg    Cardio:   neg    Respiratory:   neg    Endocrine:   neg    GI:   neg    :   neg    Musculoskeletal:    arthralgias  Hematologic:    bruises/bleeds easily  Skin:  neg        Physical Exam  Vitals reviewed.   Constitutional:       Appearance: Normal appearance.   HENT:      Head: Normocephalic and atraumatic.      Nose: Nose normal.      Mouth/Throat:      Mouth: Mucous membranes are moist.   Cardiovascular:      Rate and Rhythm: Normal rate and regular rhythm.      Pulses: Normal pulses.      Heart sounds: Normal heart sounds.   Pulmonary:      Effort: Pulmonary effort is normal.      Breath sounds: Normal breath sounds.   Abdominal:      Palpations: Abdomen is soft.   Musculoskeletal:         General: Normal range of motion.      Cervical back: Normal range of motion.   Skin:     General: Skin is warm.   Neurological:      General: No focal deficit present.      Mental Status: She is alert and oriented to person, place, and time.      Gait: Gait abnormal (ambulates with cane).   Psychiatric:         Mood and Affect:  Mood normal.         Behavior: Behavior normal.          PAT AIRWAY:   Airway:     Mallampati::  II    TM distance::  >3 FB    Neck ROM::  Full  normal        Testing/Diagnostic:     Patient Specialist/PCP:     Visit Vitals  /61   Pulse 60   Temp 36.3 °C (97.3 °F)       DASI Risk Score      Flowsheet Row Pre-Admission Testing from 11/22/2024 in [facility]   Can you take care of yourself (eat, dress, bathe, or use toilet)?  2.75 filed at 11/22/2024 0838   Can you walk indoors, such as around your house? 1.75 filed at 11/22/2024 0838   Can you walk a block or two on level ground?  2.75 filed at 11/22/2024 0838   Can you climb a flight of stairs or walk up a hill? 5.5 filed at 11/22/2024 0838   Can you run a short distance? 8 filed at 11/22/2024 0838   Can you do light work around the house like dusting or washing dishes? 2.7 filed at 11/22/2024 0838   Can you do moderate work around the house like vacuuming, sweeping floors or carrying groceries? 3.5 filed at 11/22/2024 0838   Can you do heavy work around the house like scrubbing floors or lifting and moving heavy furniture?  8 filed at 11/22/2024 0838   Can you do yard work like raking leaves, weeding or pushing a mower? 4.5 filed at 11/22/2024 0838   Can you have sexual relations? 0 filed at 11/22/2024 0838   Can you participate in moderate recreational activities like golf, bowling, dancing, doubles tennis or throwing a baseball or football? 0 filed at 11/22/2024 0838   Can you participate in strenous sports like swimming, singles tennis, football, basketball, or skiing? 0 filed at 11/22/2024 0838   DASI SCORE 39.45 filed at 11/22/2024 0838   METS Score (Will be calculated only when all the questions are answered) 7.6 filed at 11/22/2024 0838          Caprini DVT Assessment    No data to display       Modified Frailty Index    No data to display       CHADS2 Stroke Risk  Current as of 13 minutes ago        N/A 3 to 100%: High Risk   2 to <  3%: Medium Risk   0 to < 2%: Low Risk     Last Change: N/A          This score determines the patient's risk of having a stroke if the patient has atrial fibrillation.        This score is not applicable to this patient. Components are not calculated.          Revised Cardiac Risk Index    No data to display       Apfel Simplified Score    No data to display       Risk Analysis Index Results This Encounter    No data found in the last 10 encounters.       Stop Bang Score      Flowsheet Row Pre-Admission Testing from 11/22/2024 in [facility]   Do you snore loudly? 0 filed at 11/22/2024 0839   Do you often feel tired or fatigued after your sleep? 1 filed at 11/22/2024 0839   Has anyone ever observed you stop breathing in your sleep? 0 filed at 11/22/2024 0839   Do you have or are you being treated for high blood pressure? 1 filed at 11/22/2024 0839   Recent BMI (Calculated) 21.2 filed at 11/22/2024 0839   Is BMI greater than 35 kg/m2? 0=No filed at 11/22/2024 0839   Age older than 50 years old? 1=Yes filed at 11/22/2024 0839   Is your neck circumference greater than 17 inches (Male) or 16 inches (Female)? 0 filed at 11/22/2024 0839   Gender - Male 0=No filed at 11/22/2024 0839   STOP-BANG Total Score 3 filed at 11/22/2024 0839          Prodigy: High Risk  Total Score: 12              Prodigy Age Score           ARISCAT Score for Postoperative Pulmonary Complications    No data to display       [place] Perioperative Risk for Myocardial Infarction or Cardiac Arrest (CHELSIE)    No data to display         Assessment and Plan:     Anesthesia  The patient denies problems with anesthesia in the past such as PONV, prolonged sedation, awareness, dental damage, aspiration, cardiac arrest, difficult intubation, or unexpected hospital admissions.     Neurology  The patient has an cerebral aneurysm. The patient is at increased risk for postoperative delirium secondary to age 65 or older. The patient is at increased  risk for perioperative stroke secondary to hypertension , increased age, hyperlipidemia, female gender, general anesthesia, operative time >2.5 hours. Handouts for preoperative brain exercises given to patient.    HEENT/Airway  No diagnoses, significant findings on chart review, clinical presentation, or evaluation. No documented or reported history of airway difficulty.     Cardiovascular  The patient is scheduled for non-cardiac surgery associated with elevated risk. The patient has no major cardiac contraindications to non- cardiac surgery.  RCRI  The patient meets 0 RCRI criteria and therefor has a 3.9% risk of major adverse cardiac complications.  METS  The patient's functional capacity capacity is greater than 4 METS.  EKG  The patient has no EKG or echocardiographic changes concerning for myocardial ischemia.   Heart Failure  The patient has no known history of heart failure.  Additionally, the patient reports no symptoms of heart failure and demonstrates no signs of heart failure.  Hypertension Evaluation  The patient has a known history of hypertension that is controlled.  Patient's hypertension is most consistent with stage 1.  Heart Rhythm Evaluation  The patient has no history of arrhythmias.  Heart Valve Evaluation  The patient has a known history of valvular heart disease.  Per patient's prior studies, the patient has aortic valve sclerosis  Cardiology Evaluation  The patient follows with cardiology, Dr. Hagen. Patient was last seen 10-18-23. Per note,   Pt is low cardiac risk  The patient has a 30-day risk for MACE of 0 predictors, 3.9% risk for cardiac death, nonfatal myocardial infarction, and nonfatal cardiac arrest.  CHELSIE score which indicates a 0.4% risk of intraoperative or 30-day postoperative MACE (major adverse cardiac event).    Pulmonary  The patient has asthma. The patient is at increased risk of perioperative pulmonary complications secondary to neurosurgery, advanced age greater than  60.    The patient has a stop bang score of 3, which places patient at intermediate risk for having DEONTE.    ARISCAT 26, Intermediate, 13.3% risk of in-hospital postoperative pulmonary complications  PRODIGY 12, intermediate risk of respiratory depression episode. Patient given PI sheet for preoperative deep breathing exercises.    Hematology  No diagnoses or significant findings on chart review or clinical presentation and evaluation.  Antiplatelet management   The patient is not currently receiving antiplatelet therapy.  Anticoagulation management  The patient is not currently receiving anticoagulation therapy.    Caprini score 10, high risk of perioperative VTE.     Patient instructed to ambulate as soon as possible postoperatively to decrease thromboembolic risk. Initiate mechanical DVT prophylaxis as soon as possible and initiate chemical prophylaxis when deemed safe from a bleeding standpoint post surgery.     Transfusion Evaluation  A type and screen was obtained given the likelihood for perioperative transfusion of blood or blood products.    Gastrointestinal  The patient has GERD    Eat 10- 0,  self-perceived oropharyngeal dysphagia scale (0-40)     Genitourinary  The patient has h/o UTI    Renal  No renal diagnoses or significant findings on chart review or clinical presentation and evaluation.    Musculoskeletal  The patient has osteoarthritis    Endocrine  Diabetes Evaluation  The patient has no history of diabetes mellitus.  Thyroid Disease Evaluation  The patient has a history of thyroid disease that appears controlled.    ID  No diagnoses or significant findings on chart review or clinical presentation and evaluation. MRSA screening obtained. Prescriptions and instructions given for Hibiclens and Peridex.    -Preoperative medication instructions were provided and reviewed with the patient.  Any additional testing or evaluation was explained to the patient.  NPO Instructions were discussed, and the  patient's questions were answered prior to conclusion of this encounter. Patient verbalized understanding of preoperative instructions. After Visit Summary given.      Recent Results (from the past week)   Staphylococcus aureus/MRSA colonization, Culture    Collection Time: 11/22/24  9:08 AM    Specimen: Nares/Axilla/Groin; Swab   Result Value Ref Range    Staph/MRSA Screen Culture No Staphylococcus aureus isolated    CBC    Collection Time: 11/22/24  9:08 AM   Result Value Ref Range    WBC 7.5 4.4 - 11.3 x10*3/uL    nRBC 0.0 0.0 - 0.0 /100 WBCs    RBC 4.40 4.00 - 5.20 x10*6/uL    Hemoglobin 13.1 12.0 - 16.0 g/dL    Hematocrit 42.6 36.0 - 46.0 %    MCV 97 80 - 100 fL    MCH 29.8 26.0 - 34.0 pg    MCHC 30.8 (L) 32.0 - 36.0 g/dL    RDW 13.5 11.5 - 14.5 %    Platelets 299 150 - 450 x10*3/uL   Basic Metabolic Panel    Collection Time: 11/22/24  9:08 AM   Result Value Ref Range    Glucose 89 74 - 99 mg/dL    Sodium 141 136 - 145 mmol/L    Potassium 4.7 3.5 - 5.3 mmol/L    Chloride 107 98 - 107 mmol/L    Bicarbonate 25 21 - 32 mmol/L    Anion Gap 14 10 - 20 mmol/L    Urea Nitrogen 8 6 - 23 mg/dL    Creatinine 0.55 0.50 - 1.05 mg/dL    eGFR >90 >60 mL/min/1.73m*2    Calcium 9.9 8.6 - 10.6 mg/dL   Type And Screen    Collection Time: 11/22/24  9:08 AM   Result Value Ref Range    ABO TYPE A     Rh TYPE POS     ANTIBODY SCREEN NEG

## 2024-11-23 LAB — STAPHYLOCOCCUS SPEC CULT: NORMAL

## 2024-11-27 RX ORDER — DORZOLAMIDE HCL 20 MG/ML
1 SOLUTION/ DROPS OPHTHALMIC 2 TIMES DAILY
Status: ON HOLD | COMMUNITY

## 2024-11-27 RX ORDER — FUROSEMIDE 20 MG/1
20 TABLET ORAL DAILY PRN
Status: ON HOLD | COMMUNITY

## 2024-11-27 RX ORDER — POTASSIUM CHLORIDE 600 MG/1
8 TABLET, FILM COATED, EXTENDED RELEASE ORAL DAILY
Status: ON HOLD | COMMUNITY

## 2024-11-27 RX ORDER — LATANOPROST 50 UG/ML
1 SOLUTION/ DROPS OPHTHALMIC NIGHTLY
Status: ON HOLD | COMMUNITY

## 2024-11-27 NOTE — PROGRESS NOTES
Pharmacy Medication History Review    Cristina Rivera is a 79 y.o. female who is planned to be admitted for Cerebral aneurysm ([facility]). Pharmacy called the patient prior to their scheduled procedure and reviewed the patient's tkwys-bb-sxjduwzhg medications for accuracy.    Medications ADDED:  Advair 230-21mcg  Potassium chloride 8meq  Dorzolamide 2%  Furosemide 20mg  Medications CHANGED:  Colesevelam 625mg directions from #3BID to #3 tablets at lunch  Dorzolamide/timolol TO dorzolamide 2%  Advair 45-21mcg TO advair 230-21mcg  Potassium phoshate monobasic 500mg TO potassium chloride 8meq  Trimethoprim 100mg directions from #1BID to #1QD  Medications REMOVED:   Budesonide 3mg  Dorzolamide/timolol 22.3-6.8mg/ml  Advair 45-21mcg  Potassium phosphate monobasic 500mg    Please review updated prior to admission medication list and comments regarding how patient may be taking medications differently by going to Admission tab --> Admission Orders --> Admit Orders / Review prior to admission medications.     Preferred pharmacy, last doses of medications, and allergies to be confirmed with patient by nursing the day of procedure.     Sources used to complete the med history include spoke with patient and daughter, medication dispense report, oarrs, care everywhere    Below are additional concerns with the patient's PTA list.  Patient states they discontinued budesonide 3mgrecently. They are no longer taking it. L.F. 11/20/24 #30/30d  Patient states they only take their colesevelam #3 tablets at lunchtime. L.F. 10/01/24 #540/90d. Prescription states #3BID  Patient confirmed they are only using plain dorzolamide 2% twice daily in both eyes during the day. L.F. 10/01/24 10ml/37d. There is no fill history of cosopt (dorzolamide/timolol) and are using latanoprost in both eyes at bedtime L.F. 10/26/24 #1/25d  Patient confirmed they are using advair 230-21mcg. L.F. 09/27/24 #1/60d. There is no fill history of the lower  strength  Patient confirmed they are taking potassium chloride 8meq. L.F. 10/24/24 #90/90d. There is no fill history of k-phos 500mg  Patient confirmed they are taking furosemide PRN for swelling. They do NOT take it every day. L.F. 10/27/24 #90/90d  Patient states they are taking trimethoprim once daily. Prescription states #1QD. L.F. 11/20/24 #30/30d    Enid Williamson   [facility]  Please reach out via Secure Chat for questions

## 2024-11-28 ENCOUNTER — ANESTHESIA EVENT (OUTPATIENT)
Dept: OPERATING ROOM | Facility: HOSPITAL | Age: 79
End: 2024-11-28
Payer: COMMERCIAL

## 2024-11-29 ENCOUNTER — HOSPITAL ENCOUNTER (INPATIENT)
Facility: HOSPITAL | Age: 79
End: 2024-11-29
Attending: NEUROLOGICAL SURGERY | Admitting: NEUROLOGICAL SURGERY
Payer: COMMERCIAL

## 2024-11-29 ENCOUNTER — APPOINTMENT (OUTPATIENT)
Dept: RADIOLOGY | Facility: HOSPITAL | Age: 79
End: 2024-11-29
Payer: COMMERCIAL

## 2024-11-29 ENCOUNTER — ANESTHESIA (OUTPATIENT)
Dept: OPERATING ROOM | Facility: HOSPITAL | Age: 79
End: 2024-11-29
Payer: COMMERCIAL

## 2024-11-29 ENCOUNTER — HOSPITAL ENCOUNTER (INPATIENT)
Dept: NEUROLOGY | Facility: HOSPITAL | Age: 79
Discharge: HOME | End: 2024-11-29
Payer: COMMERCIAL

## 2024-11-29 DIAGNOSIS — K59.03 CONSTIPATION DUE TO PAIN MEDICATION: ICD-10-CM

## 2024-11-29 DIAGNOSIS — Z98.890 S/P CRANIOTOMY: ICD-10-CM

## 2024-11-29 DIAGNOSIS — Z74.09 IMPAIRED FUNCTIONAL MOBILITY AND ENDURANCE: ICD-10-CM

## 2024-11-29 DIAGNOSIS — Z79.899 ON DEEP VEIN THROMBOSIS (DVT) PROPHYLAXIS: ICD-10-CM

## 2024-11-29 DIAGNOSIS — K21.9 GASTROESOPHAGEAL REFLUX DISEASE, UNSPECIFIED WHETHER ESOPHAGITIS PRESENT: ICD-10-CM

## 2024-11-29 DIAGNOSIS — G89.18 POST-OP PAIN: ICD-10-CM

## 2024-11-29 DIAGNOSIS — I67.1 CEREBRAL ANEURYSM (HHS-HCC): Primary | ICD-10-CM

## 2024-11-29 PROBLEM — J45.909 ASTHMA: Status: ACTIVE | Noted: 2024-11-29

## 2024-11-29 LAB
ABO GROUP (TYPE) IN BLOOD: NORMAL
ALBUMIN SERPL BCP-MCNC: 3.8 G/DL (ref 3.4–5)
ANION GAP SERPL CALC-SCNC: 13 MMOL/L (ref 10–20)
APTT PPP: 21 SECONDS (ref 27–38)
BLOOD EXPIRATION DATE: NORMAL
BUN SERPL-MCNC: 7 MG/DL (ref 6–23)
CALCIUM SERPL-MCNC: 8.4 MG/DL (ref 8.6–10.6)
CHLORIDE SERPL-SCNC: 110 MMOL/L (ref 98–107)
CO2 SERPL-SCNC: 20 MMOL/L (ref 21–32)
CREAT SERPL-MCNC: 0.39 MG/DL (ref 0.5–1.05)
DISPENSE STATUS: NORMAL
EGFRCR SERPLBLD CKD-EPI 2021: >90 ML/MIN/1.73M*2
ERYTHROCYTE [DISTWIDTH] IN BLOOD BY AUTOMATED COUNT: 13.1 % (ref 11.5–14.5)
GLUCOSE SERPL-MCNC: 184 MG/DL (ref 74–99)
HCT VFR BLD AUTO: 35.4 % (ref 36–46)
HGB BLD-MCNC: 11.8 G/DL (ref 12–16)
INR PPP: 1 (ref 0.9–1.1)
MAGNESIUM SERPL-MCNC: 1.72 MG/DL (ref 1.6–2.4)
MCH RBC QN AUTO: 30.6 PG (ref 26–34)
MCHC RBC AUTO-ENTMCNC: 33.3 G/DL (ref 32–36)
MCV RBC AUTO: 92 FL (ref 80–100)
NRBC BLD-RTO: 0 /100 WBCS (ref 0–0)
PHOSPHATE SERPL-MCNC: 2.2 MG/DL (ref 2.5–4.9)
PLATELET # BLD AUTO: 274 X10*3/UL (ref 150–450)
POTASSIUM SERPL-SCNC: 3.2 MMOL/L (ref 3.5–5.3)
PRODUCT BLOOD TYPE: 6200
PRODUCT CODE: NORMAL
PROTHROMBIN TIME: 11.8 SECONDS (ref 9.8–12.8)
RBC # BLD AUTO: 3.86 X10*6/UL (ref 4–5.2)
RH FACTOR (ANTIGEN D): NORMAL
SODIUM SERPL-SCNC: 140 MMOL/L (ref 136–145)
UNIT ABO: NORMAL
UNIT NUMBER: NORMAL
UNIT RH: NORMAL
UNIT VOLUME: 350
WBC # BLD AUTO: 11.6 X10*3/UL (ref 4.4–11.3)
XM INTEP: NORMAL

## 2024-11-29 PROCEDURE — 2500000004 HC RX 250 GENERAL PHARMACY W/ HCPCS (ALT 636 FOR OP/ED)

## 2024-11-29 PROCEDURE — 85610 PROTHROMBIN TIME: CPT | Performed by: STUDENT IN AN ORGANIZED HEALTH CARE EDUCATION/TRAINING PROGRAM

## 2024-11-29 PROCEDURE — 2780000003 HC OR 278 NO HCPCS: Performed by: NEUROLOGICAL SURGERY

## 2024-11-29 PROCEDURE — 3600000010 HC OR TIME - EACH INCREMENTAL 1 MINUTE - PROCEDURE LEVEL FIVE: Performed by: NEUROLOGICAL SURGERY

## 2024-11-29 PROCEDURE — 70496 CT ANGIOGRAPHY HEAD: CPT | Performed by: RADIOLOGY

## 2024-11-29 PROCEDURE — 7100000002 HC RECOVERY ROOM TIME - EACH INCREMENTAL 1 MINUTE: Performed by: NEUROLOGICAL SURGERY

## 2024-11-29 PROCEDURE — 3600000005 HC OR TIME - INITIAL BASE CHARGE - PROCEDURE LEVEL FIVE: Performed by: NEUROLOGICAL SURGERY

## 2024-11-29 PROCEDURE — 37799 UNLISTED PX VASCULAR SURGERY: CPT | Performed by: STUDENT IN AN ORGANIZED HEALTH CARE EDUCATION/TRAINING PROGRAM

## 2024-11-29 PROCEDURE — 2500000005 HC RX 250 GENERAL PHARMACY W/O HCPCS: Performed by: NEUROLOGICAL SURGERY

## 2024-11-29 PROCEDURE — 61697 BRAIN ANEURYSM REPR COMPLX: CPT | Performed by: NEUROLOGICAL SURGERY

## 2024-11-29 PROCEDURE — 95938 SOMATOSENSORY TESTING: CPT

## 2024-11-29 PROCEDURE — 85027 COMPLETE CBC AUTOMATED: CPT | Performed by: STUDENT IN AN ORGANIZED HEALTH CARE EDUCATION/TRAINING PROGRAM

## 2024-11-29 PROCEDURE — 2500000001 HC RX 250 WO HCPCS SELF ADMINISTERED DRUGS (ALT 637 FOR MEDICARE OP): Performed by: STUDENT IN AN ORGANIZED HEALTH CARE EDUCATION/TRAINING PROGRAM

## 2024-11-29 PROCEDURE — 03LG0CZ OCCLUSION OF INTRACRANIAL ARTERY WITH EXTRALUMINAL DEVICE, OPEN APPROACH: ICD-10-PCS | Performed by: NEUROLOGICAL SURGERY

## 2024-11-29 PROCEDURE — 85730 THROMBOPLASTIN TIME PARTIAL: CPT | Performed by: STUDENT IN AN ORGANIZED HEALTH CARE EDUCATION/TRAINING PROGRAM

## 2024-11-29 PROCEDURE — C1713 ANCHOR/SCREW BN/BN,TIS/BN: HCPCS | Performed by: NEUROLOGICAL SURGERY

## 2024-11-29 PROCEDURE — 36620 INSERTION CATHETER ARTERY: CPT

## 2024-11-29 PROCEDURE — 69990 MICROSURGERY ADD-ON: CPT | Performed by: NEUROLOGICAL SURGERY

## 2024-11-29 PROCEDURE — 3700000001 HC GENERAL ANESTHESIA TIME - INITIAL BASE CHARGE: Performed by: NEUROLOGICAL SURGERY

## 2024-11-29 PROCEDURE — 70496 CT ANGIOGRAPHY HEAD: CPT

## 2024-11-29 PROCEDURE — 2500000004 HC RX 250 GENERAL PHARMACY W/ HCPCS (ALT 636 FOR OP/ED): Performed by: NEUROLOGICAL SURGERY

## 2024-11-29 PROCEDURE — 80069 RENAL FUNCTION PANEL: CPT | Performed by: STUDENT IN AN ORGANIZED HEALTH CARE EDUCATION/TRAINING PROGRAM

## 2024-11-29 PROCEDURE — 2500000004 HC RX 250 GENERAL PHARMACY W/ HCPCS (ALT 636 FOR OP/ED): Performed by: STUDENT IN AN ORGANIZED HEALTH CARE EDUCATION/TRAINING PROGRAM

## 2024-11-29 PROCEDURE — 2020000001 HC ICU ROOM DAILY

## 2024-11-29 PROCEDURE — 83735 ASSAY OF MAGNESIUM: CPT | Performed by: STUDENT IN AN ORGANIZED HEALTH CARE EDUCATION/TRAINING PROGRAM

## 2024-11-29 PROCEDURE — 2500000005 HC RX 250 GENERAL PHARMACY W/O HCPCS

## 2024-11-29 PROCEDURE — 36415 COLL VENOUS BLD VENIPUNCTURE: CPT | Performed by: ANESTHESIOLOGY

## 2024-11-29 PROCEDURE — 7100000001 HC RECOVERY ROOM TIME - INITIAL BASE CHARGE: Performed by: NEUROLOGICAL SURGERY

## 2024-11-29 PROCEDURE — 2550000001 HC RX 255 CONTRASTS: Performed by: NEUROLOGICAL SURGERY

## 2024-11-29 PROCEDURE — 2720000007 HC OR 272 NO HCPCS: Performed by: NEUROLOGICAL SURGERY

## 2024-11-29 PROCEDURE — C1763 CONN TISS, NON-HUMAN: HCPCS | Performed by: NEUROLOGICAL SURGERY

## 2024-11-29 PROCEDURE — 62272 THER SPI PNXR DRG CSF: CPT | Performed by: NEUROLOGICAL SURGERY

## 2024-11-29 PROCEDURE — 3700000002 HC GENERAL ANESTHESIA TIME - EACH INCREMENTAL 1 MINUTE: Performed by: NEUROLOGICAL SURGERY

## 2024-11-29 DEVICE — PLATE, SHUNT, EXTENDED CUTOUT UN3: Type: IMPLANTABLE DEVICE | Site: CRANIAL | Status: FUNCTIONAL

## 2024-11-29 DEVICE — IMPLANTABLE DEVICE: Type: IMPLANTABLE DEVICE | Site: BRAIN | Status: FUNCTIONAL

## 2024-11-29 DEVICE — CLIP, ANEURYSM, YASARGIL, STANDARD, STRAIGHT, 9 MM, TITANIUM: Type: IMPLANTABLE DEVICE | Site: BRAIN | Status: FUNCTIONAL

## 2024-11-29 DEVICE — CROSS PIN, AXS SELF-TAP, 1.5 X 4MM: Type: IMPLANTABLE DEVICE | Site: CRANIAL | Status: FUNCTIONAL

## 2024-11-29 DEVICE — IMPLANTABLE DEVICE: Type: IMPLANTABLE DEVICE | Site: CRANIAL | Status: FUNCTIONAL

## 2024-11-29 DEVICE — DURAGEN® PLUS DURAL REGENERATION MATRIX , 4 IN X 5 IN
Type: IMPLANTABLE DEVICE | Site: BRAIN | Status: FUNCTIONAL
Brand: DURAGEN® PLUS

## 2024-11-29 RX ORDER — ATORVASTATIN CALCIUM 10 MG/1
10 TABLET, FILM COATED ORAL DAILY
Status: DISPENSED | OUTPATIENT
Start: 2024-11-30

## 2024-11-29 RX ORDER — ONDANSETRON HYDROCHLORIDE 2 MG/ML
4 INJECTION, SOLUTION INTRAVENOUS ONCE AS NEEDED
Status: DISCONTINUED | OUTPATIENT
Start: 2024-11-29 | End: 2024-11-29 | Stop reason: HOSPADM

## 2024-11-29 RX ORDER — LEVETIRACETAM 500 MG/1
500 TABLET ORAL 2 TIMES DAILY
Status: DISPENSED | OUTPATIENT
Start: 2024-11-29

## 2024-11-29 RX ORDER — PROPOFOL 10 MG/ML
INJECTION, EMULSION INTRAVENOUS CONTINUOUS PRN
Status: DISCONTINUED | OUTPATIENT
Start: 2024-11-29 | End: 2024-11-29

## 2024-11-29 RX ORDER — LIDOCAINE HYDROCHLORIDE AND EPINEPHRINE 5; 5 MG/ML; UG/ML
INJECTION, SOLUTION INFILTRATION; PERINEURAL AS NEEDED
Status: DISCONTINUED | OUTPATIENT
Start: 2024-11-29 | End: 2024-11-29 | Stop reason: HOSPADM

## 2024-11-29 RX ORDER — HYDRALAZINE HYDROCHLORIDE 20 MG/ML
10 INJECTION INTRAMUSCULAR; INTRAVENOUS
Status: ACTIVE | OUTPATIENT
Start: 2024-11-29

## 2024-11-29 RX ORDER — SODIUM CHLORIDE, SODIUM LACTATE, POTASSIUM CHLORIDE, CALCIUM CHLORIDE 600; 310; 30; 20 MG/100ML; MG/100ML; MG/100ML; MG/100ML
50 INJECTION, SOLUTION INTRAVENOUS CONTINUOUS
Status: DISCONTINUED | OUTPATIENT
Start: 2024-11-29 | End: 2024-11-29 | Stop reason: HOSPADM

## 2024-11-29 RX ORDER — TAMSULOSIN HYDROCHLORIDE 0.4 MG/1
0.4 CAPSULE ORAL DAILY
Status: DISPENSED | OUTPATIENT
Start: 2024-11-29

## 2024-11-29 RX ORDER — OXYCODONE HYDROCHLORIDE 5 MG/1
2.5 TABLET ORAL EVERY 6 HOURS PRN
Status: ACTIVE | OUTPATIENT
Start: 2024-11-29

## 2024-11-29 RX ORDER — FAMOTIDINE 20 MG/1
20 TABLET, FILM COATED ORAL 2 TIMES DAILY
Status: DISPENSED | OUTPATIENT
Start: 2024-11-29

## 2024-11-29 RX ORDER — ONDANSETRON 4 MG/1
4 TABLET, FILM COATED ORAL EVERY 8 HOURS PRN
Status: ACTIVE | OUTPATIENT
Start: 2024-11-29

## 2024-11-29 RX ORDER — METHYLENE BLUE 5 MG/ML
INJECTION INTRAVENOUS AS NEEDED
Status: DISCONTINUED | OUTPATIENT
Start: 2024-11-29 | End: 2024-11-29

## 2024-11-29 RX ORDER — LEVETIRACETAM 500 MG/5ML
INJECTION, SOLUTION, CONCENTRATE INTRAVENOUS AS NEEDED
Status: DISCONTINUED | OUTPATIENT
Start: 2024-11-29 | End: 2024-11-29

## 2024-11-29 RX ORDER — POTASSIUM CHLORIDE 20 MEQ/1
10 TABLET, EXTENDED RELEASE ORAL DAILY
Status: DISPENSED | OUTPATIENT
Start: 2024-11-29

## 2024-11-29 RX ORDER — SODIUM CHLORIDE 9 MG/ML
50 INJECTION, SOLUTION INTRAVENOUS CONTINUOUS
Status: ACTIVE | OUTPATIENT
Start: 2024-11-29 | End: 2024-11-30

## 2024-11-29 RX ORDER — ONDANSETRON HYDROCHLORIDE 2 MG/ML
INJECTION, SOLUTION INTRAVENOUS AS NEEDED
Status: DISCONTINUED | OUTPATIENT
Start: 2024-11-29 | End: 2024-11-29

## 2024-11-29 RX ORDER — POTASSIUM CHLORIDE 14.9 MG/ML
20 INJECTION INTRAVENOUS
Status: COMPLETED | OUTPATIENT
Start: 2024-11-29 | End: 2024-11-29

## 2024-11-29 RX ORDER — LATANOPROST 50 UG/ML
1 SOLUTION/ DROPS OPHTHALMIC NIGHTLY
Status: DISPENSED | OUTPATIENT
Start: 2024-11-29

## 2024-11-29 RX ORDER — NORETHINDRONE AND ETHINYL ESTRADIOL 0.5-0.035
KIT ORAL AS NEEDED
Status: DISCONTINUED | OUTPATIENT
Start: 2024-11-29 | End: 2024-11-29

## 2024-11-29 RX ORDER — POTASSIUM CHLORIDE 600 MG/1
8 TABLET, FILM COATED, EXTENDED RELEASE ORAL DAILY
Status: DISCONTINUED | OUTPATIENT
Start: 2024-11-29 | End: 2024-11-29

## 2024-11-29 RX ORDER — OXYCODONE HYDROCHLORIDE 5 MG/1
5 TABLET ORAL EVERY 6 HOURS PRN
Status: DISPENSED | OUTPATIENT
Start: 2024-11-29

## 2024-11-29 RX ORDER — PAPAVERINE HYDROCHLORIDE 30 MG/ML
INJECTION INTRAMUSCULAR; INTRAVENOUS AS NEEDED
Status: DISCONTINUED | OUTPATIENT
Start: 2024-11-29 | End: 2024-11-29 | Stop reason: HOSPADM

## 2024-11-29 RX ORDER — DORZOLAMIDE HCL 20 MG/ML
1 SOLUTION/ DROPS OPHTHALMIC 2 TIMES DAILY
Status: DISPENSED | OUTPATIENT
Start: 2024-11-29

## 2024-11-29 RX ORDER — ACETAMINOPHEN 325 MG/1
650 TABLET ORAL EVERY 4 HOURS PRN
Status: DISCONTINUED | OUTPATIENT
Start: 2024-11-29 | End: 2024-11-29 | Stop reason: HOSPADM

## 2024-11-29 RX ORDER — POLYETHYLENE GLYCOL 3350 17 G/17G
17 POWDER, FOR SOLUTION ORAL DAILY
Status: DISPENSED | OUTPATIENT
Start: 2024-11-29

## 2024-11-29 RX ORDER — FENTANYL CITRATE 50 UG/ML
INJECTION, SOLUTION INTRAMUSCULAR; INTRAVENOUS AS NEEDED
Status: DISCONTINUED | OUTPATIENT
Start: 2024-11-29 | End: 2024-11-29

## 2024-11-29 RX ORDER — HYDROMORPHONE HYDROCHLORIDE 0.2 MG/ML
0.2 INJECTION INTRAMUSCULAR; INTRAVENOUS; SUBCUTANEOUS
Status: DISPENSED | OUTPATIENT
Start: 2024-11-29

## 2024-11-29 RX ORDER — SODIUM CHLORIDE 0.9 G/100ML
IRRIGANT IRRIGATION AS NEEDED
Status: DISCONTINUED | OUTPATIENT
Start: 2024-11-29 | End: 2024-11-29 | Stop reason: HOSPADM

## 2024-11-29 RX ORDER — LABETALOL HYDROCHLORIDE 5 MG/ML
10 INJECTION, SOLUTION INTRAVENOUS
Status: DISCONTINUED | OUTPATIENT
Start: 2024-11-29 | End: 2024-11-29 | Stop reason: HOSPADM

## 2024-11-29 RX ORDER — LABETALOL HYDROCHLORIDE 5 MG/ML
INJECTION, SOLUTION INTRAVENOUS AS NEEDED
Status: DISCONTINUED | OUTPATIENT
Start: 2024-11-29 | End: 2024-11-29

## 2024-11-29 RX ORDER — PROPOFOL 10 MG/ML
INJECTION, EMULSION INTRAVENOUS AS NEEDED
Status: DISCONTINUED | OUTPATIENT
Start: 2024-11-29 | End: 2024-11-29

## 2024-11-29 RX ORDER — ESMOLOL HYDROCHLORIDE 10 MG/ML
INJECTION INTRAVENOUS AS NEEDED
Status: DISCONTINUED | OUTPATIENT
Start: 2024-11-29 | End: 2024-11-29

## 2024-11-29 RX ORDER — MANNITOL 20 G/100ML
INJECTION, SOLUTION INTRAVENOUS AS NEEDED
Status: DISCONTINUED | OUTPATIENT
Start: 2024-11-29 | End: 2024-11-29

## 2024-11-29 RX ORDER — CEFAZOLIN 1 G/1
INJECTION, POWDER, FOR SOLUTION INTRAVENOUS AS NEEDED
Status: DISCONTINUED | OUTPATIENT
Start: 2024-11-29 | End: 2024-11-29

## 2024-11-29 RX ORDER — FLUTICASONE FUROATE AND VILANTEROL 200; 25 UG/1; UG/1
1 POWDER RESPIRATORY (INHALATION)
Status: DISPENSED | OUTPATIENT
Start: 2024-11-30

## 2024-11-29 RX ORDER — CARVEDILOL 12.5 MG/1
12.5 TABLET ORAL 2 TIMES DAILY
Status: DISPENSED | OUTPATIENT
Start: 2024-11-29

## 2024-11-29 RX ORDER — ONDANSETRON HYDROCHLORIDE 2 MG/ML
4 INJECTION, SOLUTION INTRAVENOUS EVERY 8 HOURS PRN
Status: ACTIVE | OUTPATIENT
Start: 2024-11-29

## 2024-11-29 RX ORDER — HYDROMORPHONE HYDROCHLORIDE 0.2 MG/ML
0.2 INJECTION INTRAMUSCULAR; INTRAVENOUS; SUBCUTANEOUS EVERY 5 MIN PRN
Status: DISCONTINUED | OUTPATIENT
Start: 2024-11-29 | End: 2024-11-29 | Stop reason: HOSPADM

## 2024-11-29 RX ORDER — BACITRACIN 500 [USP'U]/G
OINTMENT TOPICAL AS NEEDED
Status: DISCONTINUED | OUTPATIENT
Start: 2024-11-29 | End: 2024-11-29 | Stop reason: HOSPADM

## 2024-11-29 RX ORDER — LIDOCAINE HYDROCHLORIDE 20 MG/ML
INJECTION, SOLUTION INFILTRATION; PERINEURAL AS NEEDED
Status: DISCONTINUED | OUTPATIENT
Start: 2024-11-29 | End: 2024-11-29

## 2024-11-29 RX ORDER — TRIMETHOPRIM 100 MG/1
100 TABLET ORAL DAILY
Status: DISPENSED | OUTPATIENT
Start: 2024-11-29

## 2024-11-29 RX ORDER — ROCURONIUM BROMIDE 10 MG/ML
INJECTION, SOLUTION INTRAVENOUS AS NEEDED
Status: DISCONTINUED | OUTPATIENT
Start: 2024-11-29 | End: 2024-11-29

## 2024-11-29 RX ORDER — ACETAMINOPHEN 325 MG/1
975 TABLET ORAL EVERY 6 HOURS PRN
Status: DISPENSED | OUTPATIENT
Start: 2024-11-29

## 2024-11-29 RX ADMIN — PROPOFOL 20 MG: 10 INJECTION, EMULSION INTRAVENOUS at 09:04

## 2024-11-29 RX ADMIN — ESMOLOL HYDROCHLORIDE 30 MG: 10 INJECTION, SOLUTION INTRAVENOUS at 07:44

## 2024-11-29 RX ADMIN — CEFAZOLIN 2 G: 1 INJECTION, POWDER, FOR SOLUTION INTRAMUSCULAR; INTRAVENOUS at 08:36

## 2024-11-29 RX ADMIN — PROPOFOL 20 MG: 10 INJECTION, EMULSION INTRAVENOUS at 11:13

## 2024-11-29 RX ADMIN — PROPOFOL 10 MG: 10 INJECTION, EMULSION INTRAVENOUS at 10:59

## 2024-11-29 RX ADMIN — PROPOFOL 40 MG: 10 INJECTION, EMULSION INTRAVENOUS at 08:02

## 2024-11-29 RX ADMIN — ONDANSETRON 4 MG: 2 INJECTION, SOLUTION INTRAMUSCULAR; INTRAVENOUS at 12:16

## 2024-11-29 RX ADMIN — ROCURONIUM 5 MG: 50 INJECTION, SOLUTION INTRAVENOUS at 12:02

## 2024-11-29 RX ADMIN — LIDOCAINE HYDROCHLORIDE 50 MG: 20 INJECTION, SOLUTION INFILTRATION; PERINEURAL at 07:39

## 2024-11-29 RX ADMIN — ROCURONIUM 10 MG: 50 INJECTION, SOLUTION INTRAVENOUS at 10:16

## 2024-11-29 RX ADMIN — LIDOCAINE HYDROCHLORIDE 50 MG: 20 INJECTION, SOLUTION INFILTRATION; PERINEURAL at 12:30

## 2024-11-29 RX ADMIN — METHYLENE BLUE 25 MG: 5 INJECTION INTRAVENOUS at 11:03

## 2024-11-29 RX ADMIN — LEVETIRACETAM 500 MG: 500 INJECTION, SOLUTION, CONCENTRATE INTRAVENOUS at 08:48

## 2024-11-29 RX ADMIN — ROCURONIUM 60 MG: 50 INJECTION, SOLUTION INTRAVENOUS at 07:39

## 2024-11-29 RX ADMIN — FENTANYL CITRATE 50 MCG: 50 INJECTION, SOLUTION INTRAMUSCULAR; INTRAVENOUS at 07:39

## 2024-11-29 RX ADMIN — PROPOFOL 20 MG: 10 INJECTION, EMULSION INTRAVENOUS at 11:39

## 2024-11-29 RX ADMIN — FENTANYL CITRATE 50 MCG: 50 INJECTION, SOLUTION INTRAMUSCULAR; INTRAVENOUS at 09:02

## 2024-11-29 RX ADMIN — PROPOFOL 70 MG: 10 INJECTION, EMULSION INTRAVENOUS at 07:39

## 2024-11-29 RX ADMIN — PROPOFOL 30 MG: 10 INJECTION, EMULSION INTRAVENOUS at 11:03

## 2024-11-29 RX ADMIN — PROPOFOL 40 MG: 10 INJECTION, EMULSION INTRAVENOUS at 08:05

## 2024-11-29 RX ADMIN — LABETALOL HYDROCHLORIDE 5 MG: 5 INJECTION INTRAVENOUS at 12:47

## 2024-11-29 RX ADMIN — MANNITOL 22 G: 20 INJECTION, SOLUTION INTRAVENOUS at 08:50

## 2024-11-29 RX ADMIN — SODIUM CHLORIDE: 9 INJECTION, SOLUTION INTRAVENOUS at 07:36

## 2024-11-29 RX ADMIN — PROPOFOL 50 MG: 10 INJECTION, EMULSION INTRAVENOUS at 08:26

## 2024-11-29 RX ADMIN — LABETALOL HYDROCHLORIDE 10 MG: 5 INJECTION INTRAVENOUS at 12:49

## 2024-11-29 RX ADMIN — PROPOFOL 30 MG: 10 INJECTION, EMULSION INTRAVENOUS at 09:02

## 2024-11-29 RX ADMIN — PROPOFOL 30 MG: 10 INJECTION, EMULSION INTRAVENOUS at 08:29

## 2024-11-29 RX ADMIN — DEXAMETHASONE SODIUM PHOSPHATE 10 MG: 4 INJECTION INTRA-ARTICULAR; INTRALESIONAL; INTRAMUSCULAR; INTRAVENOUS; SOFT TISSUE at 08:50

## 2024-11-29 RX ADMIN — PROPOFOL 60 MG: 10 INJECTION, EMULSION INTRAVENOUS at 08:07

## 2024-11-29 RX ADMIN — EPHEDRINE SULFATE 5 MG: 50 INJECTION, SOLUTION INTRAVENOUS at 11:12

## 2024-11-29 RX ADMIN — LABETALOL HYDROCHLORIDE 10 MG: 5 INJECTION INTRAVENOUS at 12:52

## 2024-11-29 RX ADMIN — PROPOFOL 40 MG: 10 INJECTION, EMULSION INTRAVENOUS at 07:41

## 2024-11-29 RX ADMIN — LABETALOL HYDROCHLORIDE 10 MG: 5 INJECTION INTRAVENOUS at 12:30

## 2024-11-29 RX ADMIN — METHYLENE BLUE 25 MG: 5 INJECTION INTRAVENOUS at 11:09

## 2024-11-29 RX ADMIN — PROPOFOL 10 MG: 10 INJECTION, EMULSION INTRAVENOUS at 10:40

## 2024-11-29 RX ADMIN — PROPOFOL 100 MCG/KG/MIN: 10 INJECTION, EMULSION INTRAVENOUS at 08:31

## 2024-11-29 RX ADMIN — SUGAMMADEX 200 MG: 100 INJECTION, SOLUTION INTRAVENOUS at 12:41

## 2024-11-29 ASSESSMENT — PAIN - FUNCTIONAL ASSESSMENT
PAIN_FUNCTIONAL_ASSESSMENT: 0-10
PAIN_FUNCTIONAL_ASSESSMENT: CPOT (CRITICAL CARE PAIN OBSERVATION TOOL)
PAIN_FUNCTIONAL_ASSESSMENT: 0-10
PAIN_FUNCTIONAL_ASSESSMENT: CPOT (CRITICAL CARE PAIN OBSERVATION TOOL)
PAIN_FUNCTIONAL_ASSESSMENT: 0-10
PAIN_FUNCTIONAL_ASSESSMENT: CPOT (CRITICAL CARE PAIN OBSERVATION TOOL)
PAIN_FUNCTIONAL_ASSESSMENT: 0-10
PAIN_FUNCTIONAL_ASSESSMENT: 0-10

## 2024-11-29 ASSESSMENT — PAIN SCALES - GENERAL
PAINLEVEL_OUTOF10: 0 - NO PAIN
PAIN_LEVEL: 1
PAINLEVEL_OUTOF10: 0 - NO PAIN
PAINLEVEL_OUTOF10: 3
PAINLEVEL_OUTOF10: 0 - NO PAIN

## 2024-11-29 NOTE — ANESTHESIA PROCEDURE NOTES
Airway  Date/Time: 11/29/2024 7:46 AM  Urgency: elective    Airway not difficult    Staffing  Performed: resident   Authorized by: Navdeep Guerra MD PhD    Performed by: Jake Fernandez DO  Patient location during procedure: OR    Indications and Patient Condition  Indications for airway management: anesthesia  Spontaneous ventilation: present  Sedation level: deep  Preoxygenated: yes  Patient position: sniffing  Mask difficulty assessment: 1 - vent by mask  Planned trial extubation    Final Airway Details  Final airway type: endotracheal airway      Successful airway: ETT  Cuffed: yes   Successful intubation technique: direct laryngoscopy  Facilitating devices/methods: intubating stylet  Endotracheal tube insertion site: oral  Blade: Ever  Blade size: #3  ETT size (mm): 7.0  Cormack-Lehane Classification: grade I - full view of glottis  Placement verified by: chest auscultation and capnometry   Inital cuff pressure (cm H2O): 8  Measured from: lips  ETT to lips (cm): 22  Number of attempts at approach: 1

## 2024-11-29 NOTE — ANESTHESIA POSTPROCEDURE EVALUATION
Patient: Cristina Rivera    Procedure Summary       Date: 11/29/24 Room / Location: [facility] OR 26 / [facility]    Anesthesia Start: 0731 Anesthesia Stop: 1301    Procedure: Right craniotomy for aneurysmal clipping (Right) Diagnosis:       Cerebral aneurysm ([facility])      (Cerebral aneurysm ([facility]) [I67.1])    Surgeons: Hermilo Mc MD Responsible Provider: Navdeep Guerra MD PhD    Anesthesia Type: general ASA Status: 3            Anesthesia Type: general    Vitals Value Taken Time   /107 11/29/24 1301   Temp 36.2 11/29/24 1301   Pulse 65 11/29/24 1258   Resp 10 11/29/24 1258   SpO2 100 % 11/29/24 1258   Vitals shown include unfiled device data.    Anesthesia Post Evaluation    Patient location during evaluation: PACU  Patient participation: complete - patient participated  Level of consciousness: awake and alert  Pain score: 1  Pain management: adequate  Multimodal analgesia pain management approach  Airway patency: patent  Cardiovascular status: acceptable and hemodynamically stable  Respiratory status: acceptable and room air  Hydration status: acceptable  Postoperative Nausea and Vomiting: none        There were no known notable events for this encounter.

## 2024-11-29 NOTE — ANESTHESIA PROCEDURE NOTES
Arterial Line:    Date/Time: 11/29/2024 8:03 AM    Staffing  Performed: resident   Authorized by: Navdeep Guerra MD PhD    Performed by: Jake Fernandez DO    An arterial line was placed. Procedure performed using ultrasound guidance.in the OR for the following indication(s): continuous blood pressure monitoring.    A 20 gauge (size), 5 cm (length), Angiocath (type) catheter was placed into the Right radial artery, secured by Tegaderm,   Seldinger technique not used.  Events:  patient tolerated procedure well with no complications.

## 2024-11-29 NOTE — HOSPITAL COURSE
Cristina Rivera is a 79 year old female with a past history of HTN, HLD, GERD, aortic stenosis, lymphocytic colitis, hyperparaT and L ophthalmic aneurysm s/p coil (2002, OSH), s/p L ICA coiling (2002, OSH) who presented with newly found acomm aneurysm.    11/29 s/p R crani for aneurysm clipping   12/1 SGD removed  12/3 Complaints of headache overnight and started on Dex for 2 days with improvement in headache.  12/5 Started on Dex 2 BID x14 days.   12/6 Patient denied acute rehab placement, decision made for discharge home.     PT/OT eval recommend acute rehab, however, patient denied placement per insurance and instead orders placed for home care.     On the day of discharge, the patient was seen and evaluated by the neurosurgery team and deemed suitable for discharge. The patient was given detailed discharge instructions and were scheduled to follow up as an outpatient.

## 2024-11-29 NOTE — BRIEF OP NOTE
Date: 2024  OR Location: [facility] OR    Name: Cristina Rivera, : 1945, Age: 79 y.o., MRN: [id number], Sex: female    Diagnosis  Pre-op Diagnosis      * Cerebral aneurysm ([facility]HS-HCC) [I67.1] Post-op Diagnosis     * Cerebral aneurysm (HHS-HCC) [I67.1]     Procedures  Right craniotomy for aneurysmal clipping  92441 - [place] COMPLX INTRACRANIAL ARYSM CAROTID CIRCULATION    [place] MICROSURG TQS REQ USE OPERATING MICROSCOPE [39863]  Surgeons      * Hermilo Mc - Primary    Resident/Fellow/Other Assistant:  Surgeons and Role:     * Halle Lee MD - Resident - Assisting     * Dari Landa MD - Resident - Assisting    Staff:   Circulator: Kirstin  Circulator: Maria Dolores Chapa Person: Patricia Chapa Person: Saima    Anesthesia Staff: Anesthesiologist: Navdeep Guerra MD PhD  C-AA: KAMINI Dacosta  Anesthesia Resident: Jake Fernandez DO    Procedure Summary  Anesthesia: General  ASA: III  Estimated Blood Loss: 50mL  Intra-op Medications:   Administrations occurring from 0700 to 1350 on 24:   Medication Name Total Dose   papaverine injection 60 mg   lidocaine-epinephrine (Xylocaine W/EPI) 0.5 %-1:200,000 injection 12 mL   gelatin absorbable (Gelfoam) 100 sponge 1 each   thrombin-recombinant (Recothrom) 5,000 unit topical solution 10,000 Units   bacitracin ointment 1 Application   sodium chloride 0.9 % irrigation solution 3,000 mL   polymyxin B 1,000,000 Units in sodium chloride 0.9 % 1,000 mL irrigation 1,000 mL   ceFAZolin (Ancef) vial 1 g 2 g   dexAMETHasone (Decadron) 4 mg/mL 10 mg   ePHEDrine injection 5 mg   esmolol 10 mg/mL 30 mg   fentaNYL (Sublimaze) injection 50 mcg/mL 100 mcg   labetalol 5 mg/mL 10 mg   levETIRAcetam (Keppra) 500 mg/5mL 500 mg   lidocaine (Xylocaine) injection 2 % 100 mg   mannitol 20% 22 g   methylene blue (Provayblue) 5 mg/mL 50 mg   ondansetron 2 mg/mL 4 mg   propofol (Diprivan) infusion 10 mg/mL 773.36 mg   propofol (Diprivan) injection 10 mg/mL 470 mg    rocuronium (ZeMuron) 50 mg/5 mL injection 75 mg   NaCl 0.9 % bolus Cannot be calculated              Anesthesia Record               Intraprocedure I/O Totals          Intake    NaCl 0.9 % bolus 1000.00 mL    mannitol 20% 110.00 mL    Propofol Drip 0.00 mL    The total shown is the total volume documented since Anesthesia Start was filed.    Total Intake 1110 mL       Output    Urine 1070 mL    Total Output 1070 mL       Net    Net Volume 40 mL          Specimen: No specimens collected               Findings: large left A1/2 junction aneurysm, small right A1/2 junction aneurysm s/p complete clip ligation    Complications:  None; patient tolerated the procedure well.     Disposition: PACU - hemodynamically stable.  Condition: stable  Specimens Collected: No specimens collected  Attending Attestation: I was present and scrubbed for the key portions of the procedure.    Hermilo Mc  Phone Number: [phone]

## 2024-11-29 NOTE — OP NOTE
Right craniotomy for aneurysmal clipping (R) Operative Note     Date: 2024  OR Location: [facility] OR    Name: Cristina Rivera : 1945, Age: 79 y.o., MRN: [id number], Sex: female    Diagnosis  Pre-op Diagnosis      * Cerebral aneurysm (HHS-HCC) [I67.1] Post-op Diagnosis     * Cerebral aneurysm ([facility]HS-HCC) [I67.1]     Procedures  Right craniotomy for aneurysmal clipping  43083 - [place] COMPLX INTRACRANIAL ARYSM CAROTID CIRCULATION    [place] MICROSURG TQS REQ USE OPERATING MICROSCOPE [72073]  Surgeons      * Hermilo Mc - Primary    Resident/Fellow/Other Assistant:  Surgeons and Role:     * Halle Lee MD - Resident - Assisting     * Dari Landa MD - Resident - Assisting    Staff:   Circulator: Kirstin  Circulator: Maria Dolores  Scrub Person: Patricia Turnerub Person: Saima    Anesthesia Staff: Anesthesiologist: Navdeep Guerra MD PhD  C-AA: KAMINI Dacosta  Anesthesia Resident: Jake Fernandez DO    Procedure Summary  Anesthesia: General  ASA: III  Estimated Blood Loss: mL  Intra-op Medications:   Administrations occurring from 0700 to 1350 on 24:   Medication Name Total Dose   papaverine injection 60 mg   lidocaine-epinephrine (Xylocaine W/EPI) 0.5 %-1:200,000 injection 12 mL   gelatin absorbable (Gelfoam) 100 sponge 1 each   thrombin-recombinant (Recothrom) 5,000 unit topical solution 10,000 Units   bacitracin ointment 1 Application   sodium chloride 0.9 % irrigation solution 3,000 mL   ceFAZolin (Ancef) vial 1 g 2 g   dexAMETHasone (Decadron) 4 mg/mL 10 mg   ePHEDrine injection 5 mg   esmolol 10 mg/mL 30 mg   fentaNYL (Sublimaze) injection 50 mcg/mL 100 mcg   levETIRAcetam (Keppra) 500 mg/5mL 500 mg   lidocaine (Xylocaine) injection 2 % 50 mg   mannitol 20% 22 g   methylene blue (Provayblue) 5 mg/mL 50 mg   propofol (Diprivan) infusion 10 mg/mL 1,138.64 mg   propofol (Diprivan) injection 10 mg/mL 450 mg   rocuronium (ZeMuron) 50 mg/5 mL injection 70 mg   NaCl 0.9 % bolus Cannot be  calculated              Anesthesia Record               Intraprocedure I/O Totals          Intake    NaCl 0.9 % bolus 1000.00 mL    mannitol 20% 110.00 mL    Propofol Drip 0.00 mL    The total shown is the total volume documented since Anesthesia Start was filed.    Total Intake 1110 mL       Output    Urine 1070 mL    Total Output 1070 mL       Net    Net Volume 40 mL          Specimen: No specimens collected              Drains and/or Catheters:   Urethral Catheter Non-latex;Straight-tip 16 Fr. (Active)       Lumbar Drain (Active)       Tourniquet Times:         Implants:  Implants       Type Name Action Serial No.      Graft GRAFT MATRIX, DURAL, DURAGEN PLUS 4X5 (1/PK) - [id number] Implanted               Findings:     Indications: Cristina Rivera is an 79 y.o. female who is having surgery for Cerebral aneurysm ([facility]) [I67.1].     The patient was seen in the preoperative area. The risks, benefits, complications, treatment options, non-operative alternatives, expected recovery and outcomes were discussed with the patient. The possibilities of reaction to medication, pulmonary aspiration, injury to surrounding structures, bleeding, recurrent infection, the need for additional procedures, failure to diagnose a condition, and creating a complication requiring transfusion or operation were discussed with the patient. The patient concurred with the proposed plan, giving informed consent.  The site of surgery was properly noted/marked if necessary per policy. The patient has been actively warmed in preoperative area. Preoperative antibiotics  Venous thrombosis prophylaxis     Procedure Details: Accutest dictating operative note patient was placed in lateral position lumbar drain catheter placed L3-4 interspace clear spinal fluid allowed to drain intermittently throughout the procedure the drain was removed at the end of the procedure the patient was then placed supine a right frontal craniotomy pterional was  performed usual fashion exposing self frontal region microscope was brought into position multilobulated complex atheromatous anterior communicating artery carotid circulation aneurysm was encountered isolated circulation because of patient's 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 made temporary clipping difficult patient was also noted of a small 1 mm aneurysm arising from the A1 2 junction on the right side both of these aneurysms were clipped occluded using temporary clip occlusion as well as permanent clipping using microdissection without complication excellent flow in the proximal distal branches after temporary clip removal was confirmed with methylene blue administration as well as microvascular Doppler there were no changes in monitoring the bone was replaced using Afrin after closing the dura with titanium mini plates and screws and the scalp and muscle were closed using sutures no complications  Complications:      Disposition:   Condition:                  Additional Details: BM-    Attending Attestation:     Hermilo Mc  Phone Number: [phone]       English

## 2024-11-29 NOTE — ANESTHESIA PROCEDURE NOTES
Peripheral IV  Date/Time: 11/29/2024 8:40 AM  Inserted by: Jake Fernandez DO    Placement  Needle size: 18 G  Laterality: left  Location: wrist  Site prep: chlorhexidine  Technique: anatomical landmarks  Attempts: 1

## 2024-11-29 NOTE — ANESTHESIA PROCEDURE NOTES
Peripheral IV  Date/Time: 11/29/2024 7:42 AM  Inserted by: Navdeep Guerra MD PhD    Placement  Needle size: 18 G  Laterality: left  Location: hand  Site prep: chlorhexidine  Technique: anatomical landmarks  Attempts: 1

## 2024-11-29 NOTE — ANESTHESIA PREPROCEDURE EVALUATION
Patient: Cristina Rivera    Procedure Information       Date/Time: 11/29/24 0700    Procedure: Right craniotomy for aneurysmal clipping (Right)    Location: [facility] OR 26 / Virtual [facility] OR    Surgeons: Hermilo Mc MD            Relevant Problems   Cardiac (within normal limits)      Pulmonary   (+) Asthma      Neuro   (+) Cerebral aneurysm (HHS-HCC)      GI   (+) Gastroesophageal reflux disease      /Renal (within normal limits)      Liver (within normal limits)      Endocrine (within normal limits)      Hematology (within normal limits)       Clinical information reviewed:   Tobacco  Allergies  Meds   Med Hx  Surg Hx  OB Status  Fam Hx  Soc   Hx        NPO Detail:  NPO/Void Status  Carbohydrate Drink Given Prior to Surgery? : N  Date of Last Liquid: 11/29/24  Time of Last Liquid: 0400  Date of Last Solid: 11/29/24  Time of Last Solid: 1800  Last Intake Type: Clear fluids  Time of Last Void: 0555         Physical Exam    Airway  Mallampati: III  TM distance: >3 FB     Cardiovascular   Rhythm: regular  Rate: normal  (+) murmur     Dental - normal exam     Pulmonary - normal exam     Abdominal - normal exam             Anesthesia Plan    History of general anesthesia?: yes  History of complications of general anesthesia?: no    ASA 3     general     intravenous induction   Trial extubation is planned.  Anesthetic plan and risks discussed with patient.  Use of blood products discussed with patient who consented to blood products.    Plan discussed with attending and resident.    Attending Anesthesiologist Note  Above information reviewed including relevant HPI, PMHx, PSHx, anesthesia history, labs, and imaging.    Summary (from patient interview and chart review):   79 year old female with a PMHx significant for HTN, HLD, aortic valve sclerosis, asthma, glaucoma, cataracts, GERD, ulcerative colitis, anemia, UTIs, osteoarthritis     s/p coiling of left superior ophthalmic aneurysm and L ICA  occlusion coils. Coiling was completed 27 years ago at [facility]. Last vascular imaging was completed in 2002 (read only) where she was noted to have coil artifact with lack of flow related enhancement in L ICA but noted a 1cm segment of flow in proximal ICA and 1.5cm segment flow in terminal ICA. No additional aneurysm noted at that time.     Recently evaluated for an elevated PTH of 135. Patient was recommended for CT/CTA imaging noted a parathyroid adenoma as well as a 5-6mm acomm aneurysm, asymptomatic. Angio was completed 10/16/24 which noted an anteriorly projecting wide neck acomm measuring 5.5 x 4.7mm with a 3.9mm neck.     for right craniotomy for aneurysmal clipping with Dr. Mc on 11-29-24.           Relevant Problems   Cardiac (within normal limits)      Pulmonary   (+) Asthma      Neuro   (+) Cerebral aneurysm (HHS-HCC)      GI   (+) Gastroesophageal reflux disease      /Renal (within normal limits)      Liver (within normal limits)      Endocrine (within normal limits)      Hematology (within normal limits)        Medication Documentation Review Audit       Reviewed by Dottie Moy RN (Registered Nurse) on 11/29/24 at 0612      Medication Order Taking? Sig Documenting Provider Last Dose Status   atorvastatin (Lipitor) 10 mg tablet [id number] Yes Take 1 tablet (10 mg) by mouth once daily. Historical Provider, MD 11/29/2024 Morning Active   carvedilol (Coreg) 12.5 mg tablet [id number] Yes Take 1 tablet (12.5 mg) by mouth 2 times a day. Historical Provider, MD 11/29/2024 Morning Active   chlorhexidine (Hibiclens) 4 % external liquid [id number] Yes Apply topically once daily as needed for wound care for up to 5 days. TRESA Augustin 11/29/2024 Morning Active   chlorhexidine (Peridex) 0.12 % solution [id number] Yes Use 15 mL in the mouth or throat if needed (Please rinse mouth night before surgey and morning of surgery) for up to 2 days. TRESA Augustin 11/29/2024 Morning  Active   colesevelam (Welchol) 625 mg tablet [id number] Yes Take 3 tablets (1,875 mg) by mouth 2 times daily (morning and late afternoon). Take with meal(s) and a liquid.   Patient taking differently: Take 3 tablets (1,875 mg) by mouth 2 times daily (morning and late afternoon). Takes #3 tablets once daily at lunch    Vaishnavi Preciado MD 11/28/2024 Evening Active   dorzolamide (Trusopt) 2 % ophthalmic solution [id number] Yes Administer 1 drop into both eyes 2 times a day. During the day Vaishnavi Preciado MD 11/29/2024 Morning Active   famotidine (Pepcid) 20 mg tablet [id number] Yes Take 1 tablet (20 mg) by mouth 2 times a day. Vaishnavi Preciado MD 11/29/2024 Morning Active   fluticasone propion-salmeteroL (Advair) 230-21 mcg/actuation inhaler [id number] Yes Inhale 1 puff 2 times a day. Rinse mouth with water after use to reduce aftertaste and incidence of candidiasis. Do not swallow. Vaishnavi Preciado MD 11/29/2024 Active   furosemide (Lasix) 20 mg tablet [id number] Yes Take 1 tablet (20 mg) by mouth once daily as needed (leg swelling). Vaishnavi Preciado MD Past Month Active   latanoprost (Xalatan) 0.005 % ophthalmic solution [id number] Yes Administer 1 drop into both eyes once daily at bedtime. Vaishnavi Preciado MD 11/28/2024 Bedtime Active   losartan (Cozaar) 50 mg tablet [id number] Yes Take 1 tablet (50 mg) by mouth once daily. Vaishnavi Preciado MD 11/28/2024 Noon Active   potassium chloride CR (Klor-Con) 8 mEq ER tablet [id number] Yes Take 1 tablet (8 mEq) by mouth once daily. Do not crush, chew, or split. Vaishnavi Preciado MD 11/28/2024 Morning Active   tamsulosin (Flomax) 0.4 mg 24 hr capsule [id number] Yes Take 1 capsule (0.4 mg) by mouth once daily. Vaishnavi Preciado MD 11/28/2024 Morning Active   trimethoprim (Trimpex) 100 mg tablet [id number] Yes Take 1 tablet (100 mg) by mouth once daily. Vaishnavi Preciado MD 11/28/2024 Morning Active                    Visit Vitals  /68   Pulse  "66   Temp 36.1 °C (97 °F) (Temporal)   Resp 16   Wt (!) 44.6 kg (98 lb 5.2 oz)   SpO2 99%   BMI 21.28 kg/m²   OB Status Postmenopausal   Smoking Status Former   BSA 1.34 m²            10/16/2024    10:45 AM 10/16/2024    11:15 AM 10/16/2024    11:45 AM 10/16/2024    12:45 PM 10/28/2024     1:03 PM 11/22/2024     8:37 AM 11/29/2024     6:15 AM   Vitals   Systolic 159 148 129 125 153 150 161   Diastolic 74 64 63 68 66 61 68   BP Location     Left arm     Heart Rate 70 75 64 65 63 60 66   Temp     36.3 °C (97.3 °F) 36.3 °C (97.3 °F) 36.1 °C (97 °F)   Resp 16 16 16 17 15 16 16   Height     1.524 m (5') 1.448 m (4' 9\")    Weight (lb)     104.06 98 98.33   BMI     20.32 kg/m2 21.21 kg/m2 21.28 kg/m2   BSA (m2)     1.41 m2 1.34 m2 1.34 m2   Visit Report     Report          Recent Labs:    Chemistry    Lab Results   Component Value Date/Time     11/22/2024 0908    K 4.7 11/22/2024 0908     11/22/2024 0908    CO2 25 11/22/2024 0908    BUN 8 11/22/2024 0908    CREATININE 0.55 11/22/2024 0908    Lab Results   Component Value Date/Time    CALCIUM 9.9 11/22/2024 0908          Lab Results   Component Value Date/Time    WBC 7.5 11/22/2024 0908    HGB 13.1 11/22/2024 0908    HCT 42.6 11/22/2024 0908     11/22/2024 0908     Lab Results   Component Value Date/Time    PROTIME 11.4 10/07/2024 1119    INR 1.0 10/07/2024 1119       Electrocardiogram:  No results found for this or any previous visit (from the past 4464 hours).    Echocardiogram:      Anesthesia History: no complications  Anesthesia Plan: GA/ETT, tylenol, std monitors, a-line    I discussed the anesthesia plan with the patient and/or family and reviewed the risks, benefits and alternatives. Agree to proceed.  Navdeep Guerra MD PhD      "

## 2024-11-30 LAB
ALBUMIN SERPL BCP-MCNC: 3.6 G/DL (ref 3.4–5)
ANION GAP SERPL CALC-SCNC: 13 MMOL/L (ref 10–20)
APTT PPP: 29 SECONDS (ref 27–38)
BUN SERPL-MCNC: 6 MG/DL (ref 6–23)
CALCIUM SERPL-MCNC: 8.1 MG/DL (ref 8.6–10.6)
CHLORIDE SERPL-SCNC: 110 MMOL/L (ref 98–107)
CO2 SERPL-SCNC: 19 MMOL/L (ref 21–32)
CREAT SERPL-MCNC: 0.34 MG/DL (ref 0.5–1.05)
EGFRCR SERPLBLD CKD-EPI 2021: >90 ML/MIN/1.73M*2
ERYTHROCYTE [DISTWIDTH] IN BLOOD BY AUTOMATED COUNT: 13 % (ref 11.5–14.5)
GLUCOSE SERPL-MCNC: 113 MG/DL (ref 74–99)
HCT VFR BLD AUTO: 33.8 % (ref 36–46)
HGB BLD-MCNC: 11.3 G/DL (ref 12–16)
INR PPP: 1.1 (ref 0.9–1.1)
MAGNESIUM SERPL-MCNC: 1.73 MG/DL (ref 1.6–2.4)
MCH RBC QN AUTO: 30.1 PG (ref 26–34)
MCHC RBC AUTO-ENTMCNC: 33.4 G/DL (ref 32–36)
MCV RBC AUTO: 90 FL (ref 80–100)
NRBC BLD-RTO: 0 /100 WBCS (ref 0–0)
PHOSPHATE SERPL-MCNC: 2.8 MG/DL (ref 2.5–4.9)
PLATELET # BLD AUTO: 266 X10*3/UL (ref 150–450)
POTASSIUM SERPL-SCNC: 4.2 MMOL/L (ref 3.5–5.3)
PROTHROMBIN TIME: 12.5 SECONDS (ref 9.8–12.8)
RBC # BLD AUTO: 3.76 X10*6/UL (ref 4–5.2)
SODIUM SERPL-SCNC: 138 MMOL/L (ref 136–145)
WBC # BLD AUTO: 11.1 X10*3/UL (ref 4.4–11.3)

## 2024-11-30 PROCEDURE — 37799 UNLISTED PX VASCULAR SURGERY: CPT | Performed by: STUDENT IN AN ORGANIZED HEALTH CARE EDUCATION/TRAINING PROGRAM

## 2024-11-30 PROCEDURE — 2500000004 HC RX 250 GENERAL PHARMACY W/ HCPCS (ALT 636 FOR OP/ED)

## 2024-11-30 PROCEDURE — 83735 ASSAY OF MAGNESIUM: CPT | Performed by: STUDENT IN AN ORGANIZED HEALTH CARE EDUCATION/TRAINING PROGRAM

## 2024-11-30 PROCEDURE — 2500000002 HC RX 250 W HCPCS SELF ADMINISTERED DRUGS (ALT 637 FOR MEDICARE OP, ALT 636 FOR OP/ED): Performed by: STUDENT IN AN ORGANIZED HEALTH CARE EDUCATION/TRAINING PROGRAM

## 2024-11-30 PROCEDURE — 2060000001 HC INTERMEDIATE ICU ROOM DAILY

## 2024-11-30 PROCEDURE — 85610 PROTHROMBIN TIME: CPT | Performed by: STUDENT IN AN ORGANIZED HEALTH CARE EDUCATION/TRAINING PROGRAM

## 2024-11-30 PROCEDURE — 85730 THROMBOPLASTIN TIME PARTIAL: CPT | Performed by: STUDENT IN AN ORGANIZED HEALTH CARE EDUCATION/TRAINING PROGRAM

## 2024-11-30 PROCEDURE — 2500000001 HC RX 250 WO HCPCS SELF ADMINISTERED DRUGS (ALT 637 FOR MEDICARE OP): Performed by: STUDENT IN AN ORGANIZED HEALTH CARE EDUCATION/TRAINING PROGRAM

## 2024-11-30 PROCEDURE — 85027 COMPLETE CBC AUTOMATED: CPT | Performed by: STUDENT IN AN ORGANIZED HEALTH CARE EDUCATION/TRAINING PROGRAM

## 2024-11-30 PROCEDURE — 2500000004 HC RX 250 GENERAL PHARMACY W/ HCPCS (ALT 636 FOR OP/ED): Mod: JZ | Performed by: STUDENT IN AN ORGANIZED HEALTH CARE EDUCATION/TRAINING PROGRAM

## 2024-11-30 PROCEDURE — 80069 RENAL FUNCTION PANEL: CPT | Performed by: STUDENT IN AN ORGANIZED HEALTH CARE EDUCATION/TRAINING PROGRAM

## 2024-11-30 PROCEDURE — 2500000004 HC RX 250 GENERAL PHARMACY W/ HCPCS (ALT 636 FOR OP/ED): Performed by: STUDENT IN AN ORGANIZED HEALTH CARE EDUCATION/TRAINING PROGRAM

## 2024-11-30 RX ORDER — CALCIUM GLUCONATE 20 MG/ML
2 INJECTION, SOLUTION INTRAVENOUS EVERY 6 HOURS PRN
Status: ACTIVE | OUTPATIENT
Start: 2024-11-30

## 2024-11-30 RX ORDER — MAGNESIUM SULFATE HEPTAHYDRATE 40 MG/ML
4 INJECTION, SOLUTION INTRAVENOUS EVERY 6 HOURS PRN
Status: ACTIVE | OUTPATIENT
Start: 2024-11-30

## 2024-11-30 RX ORDER — POTASSIUM CHLORIDE 1.5 G/1.58G
40 POWDER, FOR SOLUTION ORAL EVERY 6 HOURS PRN
Status: ACTIVE | OUTPATIENT
Start: 2024-11-30

## 2024-11-30 RX ORDER — POTASSIUM CHLORIDE 20 MEQ/1
20 TABLET, EXTENDED RELEASE ORAL EVERY 6 HOURS PRN
Status: ACTIVE | OUTPATIENT
Start: 2024-11-30

## 2024-11-30 RX ORDER — CALCIUM GLUCONATE 20 MG/ML
2 INJECTION, SOLUTION INTRAVENOUS ONCE
Status: COMPLETED | OUTPATIENT
Start: 2024-11-30 | End: 2024-11-30

## 2024-11-30 RX ORDER — POTASSIUM CHLORIDE 20 MEQ/1
40 TABLET, EXTENDED RELEASE ORAL EVERY 6 HOURS PRN
Status: DISPENSED | OUTPATIENT
Start: 2024-11-30

## 2024-11-30 RX ORDER — POTASSIUM CHLORIDE 1.5 G/1.58G
20 POWDER, FOR SOLUTION ORAL EVERY 6 HOURS PRN
Status: ACTIVE | OUTPATIENT
Start: 2024-11-30

## 2024-11-30 RX ORDER — METHOCARBAMOL 100 MG/ML
1000 INJECTION, SOLUTION INTRAMUSCULAR; INTRAVENOUS EVERY 6 HOURS PRN
Status: DISCONTINUED | OUTPATIENT
Start: 2024-11-30 | End: 2024-11-30

## 2024-11-30 RX ORDER — METHOCARBAMOL 100 MG/ML
1000 INJECTION, SOLUTION INTRAMUSCULAR; INTRAVENOUS EVERY 6 HOURS PRN
Status: DISPENSED | OUTPATIENT
Start: 2024-11-30 | End: 2024-12-03

## 2024-11-30 RX ORDER — CALCIUM GLUCONATE 20 MG/ML
1 INJECTION, SOLUTION INTRAVENOUS EVERY 6 HOURS PRN
Status: ACTIVE | OUTPATIENT
Start: 2024-11-30

## 2024-11-30 RX ORDER — MAGNESIUM SULFATE HEPTAHYDRATE 40 MG/ML
2 INJECTION, SOLUTION INTRAVENOUS EVERY 6 HOURS PRN
Status: DISPENSED | OUTPATIENT
Start: 2024-11-30

## 2024-11-30 ASSESSMENT — PAIN - FUNCTIONAL ASSESSMENT
PAIN_FUNCTIONAL_ASSESSMENT: 0-10
PAIN_FUNCTIONAL_ASSESSMENT: CPOT (CRITICAL CARE PAIN OBSERVATION TOOL)
PAIN_FUNCTIONAL_ASSESSMENT: 0-10

## 2024-11-30 ASSESSMENT — PAIN DESCRIPTION - DESCRIPTORS
DESCRIPTORS: ACHING
DESCRIPTORS: ACHING;DISCOMFORT
DESCRIPTORS: ACHING

## 2024-11-30 ASSESSMENT — PAIN SCALES - GENERAL
PAINLEVEL_OUTOF10: 0 - NO PAIN
PAINLEVEL_OUTOF10: 0 - NO PAIN
PAINLEVEL_OUTOF10: 8
PAINLEVEL_OUTOF10: 0 - NO PAIN
PAINLEVEL_OUTOF10: 5 - MODERATE PAIN
PAINLEVEL_OUTOF10: 7
PAINLEVEL_OUTOF10: 3
PAINLEVEL_OUTOF10: 7
PAINLEVEL_OUTOF10: 3

## 2024-11-30 NOTE — PROGRESS NOTES
[facility]  NEUROSCIENCE INTENSIVE CARE UNIT  DAILY PROGRESS NOTE       Patient Name: Cristina Rivera   MRN: [id number]     Admit Date: 2024     : 1945 AGE: 79 y.o. GENDER: female        Subjective    79 y.o. female presented on 2024 with Pmx of HTN, HLD, aortic valve sclerosis, asthma, glaucoma, cataracts, GERD, ulcerative colitis, anemia, UTIs, osteoarthritis and an ACOMM aneurysm. She is s/p right craniotomy for aneurysmal clipping with Dr. Mc.    Interval Events: No acute events overnight. Required 4 PRN doses of pain medication.     Objective   VITALS (24H):  Temp:  [36.1 °C (97 °F)-37 °C (98.6 °F)] 37 °C (98.6 °F)  Heart Rate:  [63-89] 89  Resp:  [14-24] 18  BP: (117-173)/() 129/68  Arterial Line BP 1: (107-170)/(49-88) 107/49  INTAKE/OUTPUT:  Intake/Output Summary (Last 24 hours) at 2024 0920  Last data filed at 2024 0700  Gross per 24 hour   Intake 1469.17 ml   Output 3125 ml   Net -1655.83 ml     VENT SETTINGS:        PHYSICAL EXAM:  NEURO:  - Alert. Oriented x3. Did not remember that she had the surgery already. Follows complex commands across midline. CN II-XII intact. MOFFETT 5/5 strength. SILT.  CV:  - RRR on telemetry, NSR  RESP:  - No signs of resp distress and Lungs clear to ascultation  - Oxygen: DARLENE  :  - Internal catheter in place  GI:  - Abdomen NT/ND, soft  SKIN:  - Right crani site incision with overlying dressing with minimal dried blood. SGD site c/d/I.     MEDICATIONS:  Scheduled: PRN: Continuous:   atorvastatin, 10 mg, oral, Daily  carvedilol, 12.5 mg, oral, BID  dorzolamide, 1 drop, Both Eyes, BID  famotidine, 20 mg, oral, BID  fluticasone furoate-vilanteroL, 1 puff, inhalation, Daily  latanoprost, 1 drop, Both Eyes, Nightly  levETIRAcetam, 500 mg, oral, BID  polyethylene glycol, 17 g, oral, Daily  potassium chloride CR, 10 mEq, oral, Daily  tamsulosin, 0.4 mg, oral, Daily  trimethoprim, 100 mg, oral, Daily     PRN medications:  acetaminophen, calcium gluconate, calcium gluconate, hydrALAZINE, HYDROmorphone, magnesium sulfate, magnesium sulfate, ondansetron **OR** ondansetron, oxyCODONE, oxyCODONE, potassium chloride CR **OR** potassium chloride, potassium chloride CR **OR** potassium chloride sodium chloride 0.9%, 50 mL/hr, Last Rate: 50 mL/hr (11/29/24 1537)         LAB RESULTS:  Results for orders placed or performed during the hospital encounter of 11/29/24 (from the past 24 hours)   Renal Function Panel   Result Value Ref Range    Glucose 184 (H) 74 - 99 mg/dL    Sodium 140 136 - 145 mmol/L    Potassium 3.2 (L) 3.5 - 5.3 mmol/L    Chloride 110 (H) 98 - 107 mmol/L    Bicarbonate 20 (L) 21 - 32 mmol/L    Anion Gap 13 10 - 20 mmol/L    Urea Nitrogen 7 6 - 23 mg/dL    Creatinine 0.39 (L) 0.50 - 1.05 mg/dL    eGFR >90 >60 mL/min/1.73m*2    Calcium 8.4 (L) 8.6 - 10.6 mg/dL    Phosphorus 2.2 (L) 2.5 - 4.9 mg/dL    Albumin 3.8 3.4 - 5.0 g/dL   CBC   Result Value Ref Range    WBC 11.6 (H) 4.4 - 11.3 x10*3/uL    nRBC 0.0 0.0 - 0.0 /100 WBCs    RBC 3.86 (L) 4.00 - 5.20 x10*6/uL    Hemoglobin 11.8 (L) 12.0 - 16.0 g/dL    Hematocrit 35.4 (L) 36.0 - 46.0 %    MCV 92 80 - 100 fL    MCH 30.6 26.0 - 34.0 pg    MCHC 33.3 32.0 - 36.0 g/dL    RDW 13.1 11.5 - 14.5 %    Platelets 274 150 - 450 x10*3/uL   Magnesium   Result Value Ref Range    Magnesium 1.72 1.60 - 2.40 mg/dL   Coagulation Screen   Result Value Ref Range    Protime 11.8 9.8 - 12.8 seconds    INR 1.0 0.9 - 1.1    aPTT 21 (L) 27 - 38 seconds   Coagulation Screen   Result Value Ref Range    Protime 12.5 9.8 - 12.8 seconds    INR 1.1 0.9 - 1.1    aPTT 29 27 - 38 seconds   Renal Function Panel   Result Value Ref Range    Glucose 113 (H) 74 - 99 mg/dL    Sodium 138 136 - 145 mmol/L    Potassium 4.2 3.5 - 5.3 mmol/L    Chloride 110 (H) 98 - 107 mmol/L    Bicarbonate 19 (L) 21 - 32 mmol/L    Anion Gap 13 10 - 20 mmol/L    Urea Nitrogen 6 6 - 23 mg/dL    Creatinine 0.34 (L) 0.50 - 1.05 mg/dL    eGFR  >90 >60 mL/min/1.73m*2    Calcium 8.1 (L) 8.6 - 10.6 mg/dL    Phosphorus 2.8 2.5 - 4.9 mg/dL    Albumin 3.6 3.4 - 5.0 g/dL   CBC   Result Value Ref Range    WBC 11.1 4.4 - 11.3 x10*3/uL    nRBC 0.0 0.0 - 0.0 /100 WBCs    RBC 3.76 (L) 4.00 - 5.20 x10*6/uL    Hemoglobin 11.3 (L) 12.0 - 16.0 g/dL    Hematocrit 33.8 (L) 36.0 - 46.0 %    MCV 90 80 - 100 fL    MCH 30.1 26.0 - 34.0 pg    MCHC 33.4 32.0 - 36.0 g/dL    RDW 13.0 11.5 - 14.5 %    Platelets 266 150 - 450 x10*3/uL   Magnesium   Result Value Ref Range    Magnesium 1.73 1.60 - 2.40 mg/dL      IMAGING RESULTS:  CT angio head w and wo IV contrast   Final Result   Expected postsurgical changes of right craniotomy for anterior   communicating artery aneurysm clipping causing minimal right cerebral   hemisphere sulcal/right lateral ventricle effacement. Beam hardening   artifact precludes further evaluation of aneurysm remnant.        I personally reviewed the images/study and I agree with the findings   as stated by Dr. Kenton Lang. This study was interpreted at   [place], [place].        MACRO:   None.        Signed by: Rashaun Wyman 11/29/2024 5:32 PM   Dictation workstation:   [id number]           Assessment/Plan    H/o HTN, HLD, GERD, aortic stenosis, lymphocytic colitis, hyperparaT, L ophthalmic aneurysm s/p coil (2002, OSH), s/p L ICA coiling (2002, OSH), p/w newly found acomm aneurysm, 11/29 s/p R crani for aneurysm clipping. She tolerated the procedure well.     NEURO:  #AComm aneurysm s/p R crani for aneurysm clipping on 11/29  Assessment:  - Mildly disoriented but otherwise neurologically intact.  - SGD in place  Plan:  - NSU  - Neuro Checks: Q1H  - Sedation: None  - Pain: acetaminophen PRN, oxycodone PRN, and hydromorphone PRN  - Nausea: ondansetron PRN  - SGD off suction  - PT/OT - Needs post-op evaluation    CARDIOVASCULAR:  #No active issues  - Continue to monitor on telemetry  - BP goal: -160     --> PRN: Hydralazine     RESPIRATORY:  #No acute issues  Assessment:  - DARLENE  Plan:  - Continuous pulse oximetry   - O2 PRN to maintain SpO2 > 94%, wean as tolerated    RENAL/:  #No active issues  Assessment:  - Baseline BUN/Cr: 11/0.52  Results from last 72 hours   Lab Units 24  0047 24  1532   BUN mg/dL 6 7   CREATININE mg/dL 0.34* 0.39*       Plan:  - Monitor with daily RFP  - Remove catheter by POD#1    FEN/GI:  #No active issues  Assessment:  - Last BM Date:  (prior to admission)  Plan:  - Monitor and replace electrolytes per protocol  - IVF: NS @ 50 mL/hr  - Diet: Adult diet Regular    - Bowel Regimen: Miralax QD    ENDOCRINE:  #No active issues  Assessment:  Results from last 7 days   Lab Units 24  0047 24  1532   GLUCOSE mg/dL 113* 184*   SODIUM mmol/L 138 140    Plan:  - Accuchecks & ISS PRN     HEMATOLOGY:  #Acute anemia  Assessment:  - Baseline Hgb: 13.2  - Baseline Plts: 321K  Results from last 7 days   Lab Units 24  0047 24  1532   HEMOGLOBIN g/dL 11.3* 11.8*   HEMATOCRIT % 33.8* 35.4*   PLATELETS AUTO x10*3/uL 266 274   Plan:  - Continue to monitor with daily CBC and Coag panel    INFECTIOUS DISEASE:  #Reactive leukocytosis (resolved)  Assessment:  Results from last 7 days   Lab Units 24  0047 24  1532   WBC AUTO x10*3/uL 11.1 11.6*    - Temp (24hrs), Av.4 °C (97.5 °F), Min:36.1 °C (97 °F), Max:37 °C (98.6 °F)  Plan:  - Continue to monitor for s/sx of infection  - Pan culture for temperature > 38.4 C    MUSCULOSKELETAL:  - No acute issues    SKIN:  - No acute issues  - Turns and skin care per NSU protocol    ACCESS:  - pIV x3, right radial arterial line    PROPHYLAXIS:  - DVT Ppx: Hold SQH until POD #2    RESTRAINTS:  Not indicated/Patient does not meet criteria for restraints    Patient seen and discussed with attending physician, Dr. Phillip Desai.    Marialuisa Pierre MD  Child Neurology PGY3

## 2024-11-30 NOTE — PROGRESS NOTES
Cristina Rivera is a 79 y.o. female on day 1 of admission presenting with Cerebral aneurysm ([facility]).    Subjective   No acute events overnight       Objective     Physical Exam  Aox3  both eyes 3R, EOMI  Face symmetric  Fcx4 5/5  SILT  Incision with dressing overlying with minimal strike through  Drain site c/d/I    Last Recorded Vitals  Blood pressure 117/58, pulse 68, temperature 36.3 °C (97.3 °F), temperature source Temporal, resp. rate 14, weight (!) 44.6 kg (98 lb 5.2 oz), SpO2 95%.  Intake/Output last 3 Shifts:  I/O last 3 completed shifts:  In: 1229.2 (27.6 mL/kg) [I.V.:119.2 (2.7 mL/kg); IV Piggyback:1110]  Out: 2275 (51 mL/kg) [Urine:1965 (1.2 mL/kg/hr); Drains:210; Blood:100]  Weight: 44.6 kg     Relevant Results                 This patient has a urinary catheter   Reason for the urinary catheter remaining today? Urine catheter unnecessary, will be removed today               Assessment/Plan   Assessment & Plan  Cerebral aneurysm ([facility])    Asthma    Gastroesophageal reflux disease    Cristina Perez is a 79 yr old F with H/o HTN, HLD, GERD, aortic stenosis, lymphocytic colitis, hyperparaT, L ophthalmic aneurysm s/p coil (2002, OSH), s/p L ICA coiling (2002, OSH), p/w newly found acomm aneurysm, 11/29 s/p R crani for acomm aneurysm clipping     Plan:  NSU  SGD off suction   Dc lainez  SCDs, chemo ppx POD 2  PTOT - needs post op eval           Dari Landa MD

## 2024-12-01 VITALS
TEMPERATURE: 98.1 F | BODY MASS INDEX: 21.28 KG/M2 | OXYGEN SATURATION: 95 % | RESPIRATION RATE: 16 BRPM | WEIGHT: 98.33 LBS | SYSTOLIC BLOOD PRESSURE: 117 MMHG | DIASTOLIC BLOOD PRESSURE: 54 MMHG | HEART RATE: 70 BPM

## 2024-12-01 LAB
ALBUMIN SERPL BCP-MCNC: 3.2 G/DL (ref 3.4–5)
ANION GAP SERPL CALC-SCNC: 9 MMOL/L (ref 10–20)
APTT PPP: 29 SECONDS (ref 27–38)
BUN SERPL-MCNC: 6 MG/DL (ref 6–23)
CALCIUM SERPL-MCNC: 8.4 MG/DL (ref 8.6–10.6)
CHLORIDE SERPL-SCNC: 112 MMOL/L (ref 98–107)
CO2 SERPL-SCNC: 24 MMOL/L (ref 21–32)
CREAT SERPL-MCNC: 0.42 MG/DL (ref 0.5–1.05)
EGFRCR SERPLBLD CKD-EPI 2021: >90 ML/MIN/1.73M*2
ERYTHROCYTE [DISTWIDTH] IN BLOOD BY AUTOMATED COUNT: 13.7 % (ref 11.5–14.5)
GLUCOSE SERPL-MCNC: 89 MG/DL (ref 74–99)
HCT VFR BLD AUTO: 31.3 % (ref 36–46)
HGB BLD-MCNC: 10.3 G/DL (ref 12–16)
INR PPP: 1.2 (ref 0.9–1.1)
MAGNESIUM SERPL-MCNC: 1.91 MG/DL (ref 1.6–2.4)
MCH RBC QN AUTO: 30.2 PG (ref 26–34)
MCHC RBC AUTO-ENTMCNC: 32.9 G/DL (ref 32–36)
MCV RBC AUTO: 92 FL (ref 80–100)
NRBC BLD-RTO: 0 /100 WBCS (ref 0–0)
PHOSPHATE SERPL-MCNC: 2.6 MG/DL (ref 2.5–4.9)
PLATELET # BLD AUTO: 234 X10*3/UL (ref 150–450)
POTASSIUM SERPL-SCNC: 3.6 MMOL/L (ref 3.5–5.3)
PROTHROMBIN TIME: 13.6 SECONDS (ref 9.8–12.8)
RBC # BLD AUTO: 3.41 X10*6/UL (ref 4–5.2)
SODIUM SERPL-SCNC: 141 MMOL/L (ref 136–145)
WBC # BLD AUTO: 11.3 X10*3/UL (ref 4.4–11.3)

## 2024-12-01 PROCEDURE — 94640 AIRWAY INHALATION TREATMENT: CPT

## 2024-12-01 PROCEDURE — 2500000002 HC RX 250 W HCPCS SELF ADMINISTERED DRUGS (ALT 637 FOR MEDICARE OP, ALT 636 FOR OP/ED): Performed by: STUDENT IN AN ORGANIZED HEALTH CARE EDUCATION/TRAINING PROGRAM

## 2024-12-01 PROCEDURE — 83735 ASSAY OF MAGNESIUM: CPT | Performed by: STUDENT IN AN ORGANIZED HEALTH CARE EDUCATION/TRAINING PROGRAM

## 2024-12-01 PROCEDURE — 2500000004 HC RX 250 GENERAL PHARMACY W/ HCPCS (ALT 636 FOR OP/ED): Performed by: STUDENT IN AN ORGANIZED HEALTH CARE EDUCATION/TRAINING PROGRAM

## 2024-12-01 PROCEDURE — 2500000004 HC RX 250 GENERAL PHARMACY W/ HCPCS (ALT 636 FOR OP/ED)

## 2024-12-01 PROCEDURE — 2500000001 HC RX 250 WO HCPCS SELF ADMINISTERED DRUGS (ALT 637 FOR MEDICARE OP): Performed by: STUDENT IN AN ORGANIZED HEALTH CARE EDUCATION/TRAINING PROGRAM

## 2024-12-01 PROCEDURE — 85027 COMPLETE CBC AUTOMATED: CPT | Performed by: STUDENT IN AN ORGANIZED HEALTH CARE EDUCATION/TRAINING PROGRAM

## 2024-12-01 PROCEDURE — 2060000001 HC INTERMEDIATE ICU ROOM DAILY

## 2024-12-01 PROCEDURE — 97162 PT EVAL MOD COMPLEX 30 MIN: CPT | Mod: GP

## 2024-12-01 PROCEDURE — 80069 RENAL FUNCTION PANEL: CPT | Performed by: STUDENT IN AN ORGANIZED HEALTH CARE EDUCATION/TRAINING PROGRAM

## 2024-12-01 PROCEDURE — 85730 THROMBOPLASTIN TIME PARTIAL: CPT | Performed by: STUDENT IN AN ORGANIZED HEALTH CARE EDUCATION/TRAINING PROGRAM

## 2024-12-01 PROCEDURE — 97530 THERAPEUTIC ACTIVITIES: CPT | Mod: GP

## 2024-12-01 PROCEDURE — 97166 OT EVAL MOD COMPLEX 45 MIN: CPT | Mod: GO

## 2024-12-01 PROCEDURE — 37799 UNLISTED PX VASCULAR SURGERY: CPT | Performed by: STUDENT IN AN ORGANIZED HEALTH CARE EDUCATION/TRAINING PROGRAM

## 2024-12-01 RX ORDER — HEPARIN SODIUM 5000 [USP'U]/ML
5000 INJECTION, SOLUTION INTRAVENOUS; SUBCUTANEOUS EVERY 8 HOURS
Status: DISPENSED | OUTPATIENT
Start: 2024-12-01

## 2024-12-01 ASSESSMENT — PAIN SCALES - GENERAL
PAINLEVEL_OUTOF10: 0 - NO PAIN
PAINLEVEL_OUTOF10: 5 - MODERATE PAIN
PAINLEVEL_OUTOF10: 0 - NO PAIN
PAINLEVEL_OUTOF10: 8
PAINLEVEL_OUTOF10: 7
PAINLEVEL_OUTOF10: 5 - MODERATE PAIN
PAINLEVEL_OUTOF10: 0 - NO PAIN
PAINLEVEL_OUTOF10: 7
PAINLEVEL_OUTOF10: 5 - MODERATE PAIN
PAINLEVEL_OUTOF10: 7

## 2024-12-01 ASSESSMENT — COGNITIVE AND FUNCTIONAL STATUS - GENERAL
DAILY ACTIVITIY SCORE: 14
MOVING TO AND FROM BED TO CHAIR: A LITTLE
CLIMB 3 TO 5 STEPS WITH RAILING: TOTAL
MOVING FROM LYING ON BACK TO SITTING ON SIDE OF FLAT BED WITH BEDRAILS: A LITTLE
MOBILITY SCORE: 14
TURNING FROM BACK TO SIDE WHILE IN FLAT BAD: A LOT
WALKING IN HOSPITAL ROOM: A LOT
STANDING UP FROM CHAIR USING ARMS: A LITTLE
HELP NEEDED FOR BATHING: A LOT
TOILETING: A LOT
DRESSING REGULAR LOWER BODY CLOTHING: A LOT
DRESSING REGULAR UPPER BODY CLOTHING: A LOT
EATING MEALS: A LITTLE
PERSONAL GROOMING: A LITTLE

## 2024-12-01 ASSESSMENT — PAIN - FUNCTIONAL ASSESSMENT
PAIN_FUNCTIONAL_ASSESSMENT: 0-10

## 2024-12-01 ASSESSMENT — PAIN DESCRIPTION - DESCRIPTORS: DESCRIPTORS: ACHING

## 2024-12-01 ASSESSMENT — PAIN DESCRIPTION - LOCATION
LOCATION: HEAD

## 2024-12-01 ASSESSMENT — ACTIVITIES OF DAILY LIVING (ADL)
ADL_ASSISTANCE: INDEPENDENT
ADL_ASSISTANCE: INDEPENDENT
BATHING_ASSISTANCE: MAXIMAL

## 2024-12-01 NOTE — PROGRESS NOTES
Physical Therapy    Physical Therapy Evaluation & Treatment    Patient Name: Cristina Rivera  MRN: [id number]  Department: [facility]  Room: 14/14-A  Today's Date: 12/1/2024   Time Calculation  Start Time: 0957  Stop Time: 1028  Time Calculation (min): 31 min    Assessment/Plan   PT Assessment  PT Assessment Results: Decreased strength, Decreased endurance, Impaired balance, Decreased mobility, Decreased safety awareness, Pain  Rehab Prognosis: Good  Evaluation/Treatment Tolerance: Patient limited by pain  Medical Staff Made Aware: Yes  Strengths: Premorbid level of function  Barriers to Participation: Comorbidities  End of Session Communication: Bedside nurse  Assessment Comment: Pt tolerated PT Eval fairly well. At baseline is indep with mobility. Lives with , who is able to assist. Currently limited by dec strength, impaired balance, pain and activity tolerance. Would benefit from high intensity therapy upon d/c, will continue to follow  End of Session Patient Position: Bed, 3 rail up, Alarm on   IP OR SWING BED PT PLAN  Inpatient or Swing Bed: Inpatient  PT Plan  Treatment/Interventions: Bed mobility, Transfer training, Gait training, Balance training, Neuromuscular re-education, Stair training, Strengthening, Therapeutic exercise, Therapeutic activity, Home exercise program, Positioning  PT Plan: Ongoing PT  PT Frequency: 4 times per week  PT Discharge Recommendations: High intensity level of continued care  Equipment Recommended upon Discharge:  (tbd)  PT Recommended Transfer Status: Assist x1  PT - OK to Discharge: Yes (eval complete, d/c recommendation made)      Subjective     General Visit Information:  General  Reason for Referral: sp R crani for aneurysmal clipping (11/29)  Past Medical History Relevant to Rehab: HTN, HLD, GERD, aortic stenosis, lymphocytic colitis, hyperparaT, L ophthalmic aneurysm s/p coil (2002, OSH), s/p L ICA coiling (2002, OSH)  Family/Caregiver Present: Yes  Caregiver Feedback:  daughter to bedside during session  Co-Treatment: OT  Co-Treatment Reason: maximize pt's safety and mobility  Prior to Session Communication: Bedside nurse  Patient Position Received: Bed, 3 rail up, Alarm on  General Comment: supine in bed, cooperative, limited by inc head pain  Home Living:  Home Living  Type of Home: House  Lives With: Spouse  Home Adaptive Equipment: Cane, Walker rolling or standard  Home Layout: One level (baseline ( can access for now))  Home Access: Level entry  Bathroom Shower/Tub: Walk-in shower  Bathroom Toilet: Standard  Bathroom Equipment: Shower chair with back  Prior Level of Function:  Prior Function Per Pt/Caregiver Report  Level of [place]: Independent with ADLs and functional transfers, Independent with homemaking with ambulation  ADL Assistance: Independent  Homemaking Assistance: Independent  Ambulatory Assistance: Independent  Prior Function Comments: denies falls, drives  Precautions:  Precautions  Medical Precautions: Fall precautions    Vital Signs (Past 2hrs)           Vital Signs Comment: pre /75mmHg 71bpm, 99%, in sitting 157/72mmHg, post /106mmHg    Objective   Pain:  Pain Assessment  Pain Assessment: 0-10  0-10 (Numeric) Pain Score: 7  Pain Location: Head  Pain Interventions: Repositioned (pre-medicated by RN, RN notified)  Cognition:  Cognition  Overall Cognitive Status: Within Functional Limits  Orientation Level: Oriented X4  Insight: Mild    General Assessments:  General Observation  General Observation: GABO drain, arterial line, tele, PIV               Activity Tolerance  Endurance: Tolerates 10 - 20 min exercise with multiple rests    Sensation  Light Touch: No apparent deficits    Static Sitting Balance  Static Sitting-Balance Support: Feet unsupported, Bilateral upper extremity supported  Static Sitting-Level of Assistance: Contact guard    Static Standing Balance  Static Standing-Balance Support: Bilateral upper extremity supported  Static  Standing-Level of Assistance: Minimum assistance  Functional Assessments:  Bed Mobility  Bed Mobility: Yes  Bed Mobility 1  Bed Mobility 1: Supine to sitting  Level of Assistance 1: Minimum assistance  Bed Mobility Comments 1: cues for sequencing with use of bed rail, assist for trunk management  Bed Mobility 2  Bed Mobility  2: Sitting to supine  Level of Assistance 2: Minimum assistance, +2  Bed Mobility Comments 2: LE and trunk management    Transfers  Transfer: Yes  Transfer 1  Transfer From 1: Bed to, Stand to  Transfer to 1: Stand, Bed  Technique 1: Sit to stand, Stand to sit  Transfer Device 1:  (karie HHA)  Transfer Level of Assistance 1: Minimum assistance, Minimal verbal cues    Ambulation/Gait Training  Ambulation/Gait Training Performed: Yes  Ambulation/Gait Training 1  Surface 1: Level tile  Device 1:  (karie HHA)  Assistance 1: Minimum assistance, Minimal verbal cues  Quality of Gait 1: Narrow base of support, Decreased step length  Comments/Distance (ft) 1: 3 lateral side steps along EIB    Stairs  Stairs: No  Extremity/Trunk Assessments:  RLE   RLE : Within Functional Limits  LLE   LLE : Within Functional Limits  Treatments:  Therapeutic Activity  Therapeutic Activity Performed: Yes  Therapeutic Activity 1: sitting EOB x15 minutes with CGA, assist for scooting to EOB for positioning  Therapeutic Activity 2: standing with [place], karie HHA x3 minutes with cues for weight shifting and lateral side steps  Outcome Measures:  [facility] Basic Mobility  Turning from your back to your side while in a flat bed without using bedrails: A little  Moving from lying on your back to sitting on the side of a flat bed without using bedrails: A lot  Moving to and from bed to chair (including a wheelchair): A little  Standing up from a chair using your arms (e.g. wheelchair or bedside chair): A little  To walk in hospital room: A lot  Climbing 3-5 steps with railing: Total  Basic Mobility - Total Score: 14    FSS-ICU  Ambulation:  Walks <50 feet with any assistance x1 or walks any distance with assistance x2 people  Rolling: Moderate assistance (performs 50 - 74% of task)  Sitting: Minimal assistance (performs 75% or more of task)  Transfer Sit-to-Stand: Minimal assistance (performs 75% or more of task)  Transfer Supine-to-Sit: Moderate assistance (performs 50 - 74% of task)  Total Score: 15    Encounter Problems       Encounter Problems (Active)       PT Problem       Pt will perform supine<>sit mod I (Progressing)       Start:  12/01/24    Expected End:  12/15/24            Pt will perform sit<>stand with LRAD mod I (Progressing)       Start:  12/01/24    Expected End:  12/15/24            Pt will amb 150ft with LRAD mod I (Progressing)       Start:  12/01/24    Expected End:  12/15/24            Pt will amb in hallway, while completing dual task, without LOB (Progressing)       Start:  12/01/24    Expected End:  12/15/24                   Education Documentation  Precautions, taught by Ruthie Grigsby PT at 12/1/2024  2:54 PM.  Learner: Patient  Readiness: Acceptance  Method: Explanation  Response: Verbalizes Understanding  Comment: progression of mobility, fall percautions    Body Mechanics, taught by Ruthie Grigsby PT at 12/1/2024  2:54 PM.  Learner: Patient  Readiness: Acceptance  Method: Explanation  Response: Verbalizes Understanding  Comment: progression of mobility, fall percautions    Mobility Training, taught by Ruthie Grigsby PT at 12/1/2024  2:54 PM.  Learner: Patient  Readiness: Acceptance  Method: Explanation  Response: Verbalizes Understanding  Comment: progression of mobility, fall percautions    Education Comments  No comments found.      12/01/24 at 3:05 PM - Ruthie Grigsby, PT

## 2024-12-01 NOTE — PROGRESS NOTES
Occupational Therapy    Evaluation    Patient Name: Cristina Rivera  MRN: [id number]  Department: [facility]  Room: 14/14-A  Today's Date: 12/1/2024  Time Calculation  Start Time: 0957  Stop Time: 1028  Time Calculation (min): 31 min        Assessment:  OT Assessment: Patient would benefit from ongoing OT while in house to maximize independence as it relates to ADLs and functional mobility.  Evaluation/Treatment Tolerance: Patient limited by pain  Medical Staff Made Aware: Yes  End of Session Communication: Bedside nurse  End of Session Patient Position: Bed, 3 rail up, Alarm on (daughter present in room)  OT Assessment Results: Decreased ADL status, Decreased functional mobility  Evaluation/Treatment Tolerance: Patient limited by pain  Medical Staff Made Aware: Yes  Strengths: Coping skills, Premorbid level of function  Plan:  Treatment Interventions: ADL retraining, Functional transfer training  OT Frequency: 3 times per week  OT Discharge Recommendations: High intensity level of continued care  OT Recommended Transfer Status: Assist of 2  OT - OK to Discharge: Yes (OT eval complete, refer to discharge recommendations provided)  Treatment Interventions: ADL retraining, Functional transfer training    Subjective   Current Problem:  1. Cerebral aneurysm ([facility])          General:  General  Reason for Referral: sp R crani for aneurysmal clipping (11/29)  Past Medical History Relevant to Rehab: HTN, HLD, GERD, aortic stenosis, lymphocytic colitis, hyperparaT, L ophthalmic aneurysm s/p coil (2002, OSH), s/p L ICA coiling (2002, OSH)  Family/Caregiver Present: Yes  Caregiver Feedback: Pt daughter arrived at end of evaluation  Co-Treatment: PT  Co-Treatment Reason: to maximize pt safety and tolerance for treatment  Prior to Session Communication: Bedside nurse  Patient Position Received: Bed, 3 rail up, Alarm on  Preferred Learning Style: verbal, visual  General Comment: Pt supine upon arrival, agreeable to participate in  therapy. Pt participation limited by headache worsening with movement.  Precautions:  Medical Precautions: Fall precautions    Vital Sign (Past 2hrs)        Date/Time Vitals Session Patient Position Pulse Resp SpO2 BP MAP (mmHg)    12/01/24 1300 --  --  63  10  96 %  100/49  66     12/01/24 1400 --  --  66  14  96 %  116/48  68                       Pain:  Pain Assessment  Pain Assessment: 0-10  0-10 (Numeric) Pain Score: 7  Pain Location: Head  Pain Interventions: Repositioned (RN provided pain medication prior to session)    Objective   Cognition:  Overall Cognitive Status: Within Functional Limits  Orientation Level: Oriented X4  Following Commands: Follows one step commands with increased time  Safety Judgment: Decreased awareness of need for assistance  Attention: Within Functional Limits  Safety/Judgement: Within Functional Limits  Insight: Mild  Impulsive: Within functional limits  Processing Speed: Delayed           Home Living:  Type of Home: House  Lives With: Spouse  Home Adaptive Equipment: Cane, Walker rolling or standard  Home Layout: One level (ranch with basement)  Home Access: Level entry  Bathroom Shower/Tub: Walk-in shower  Bathroom Toilet: Standard  Bathroom Equipment: Shower chair with back    Prior Function:  Level of [place]: Independent with ADLs and functional transfers, Independent with homemaking with ambulation  Receives Help From: Family  ADL Assistance: Independent  Homemaking Assistance: Independent  Ambulatory Assistance: Independent  Prior Function Comments: +drives  IADL History:     ADL:  Eating Assistance: Minimal (drink from cup with straw)  Grooming Assistance: Minimal (anticipated)  Bathing Assistance: Maximal (anticipated)  UE Dressing Assistance: Moderate (adjust back of gown)  LE Dressing Assistance: Maximal  Toileting Assistance with Device: Maximal    Activity Tolerance:  Endurance: Tolerates 10 - 20 min exercise with multiple rests    Bed Mobility/Transfers: Bed  Mobility  Bed Mobility: Yes  Bed Mobility 1  Bed Mobility 1: Supine to sitting  Level of Assistance 1: Minimum assistance  Bed Mobility Comments 1: assist to bring trunk upright, cueing fror sequencing and use of bed rails  Bed Mobility 2  Bed Mobility  2: Sitting to supine  Level of Assistance 2: Minimum assistance, +2  Bed Mobility Comments 2: assist with trunk and bringing BLE into bed    Transfers  Transfer: Yes  Transfer 1  Transfer From 1: Bed to  Transfer to 1: Stand  Technique 1: Sit to stand, Stand to sit  Transfer Device 1:  (hand held assist)  Transfer Level of Assistance 1: Minimum assistance      Functional Mobility:  Functional Mobility  Functional Mobility Performed: Yes  Functional Mobility 1  Assistance 1: Hand held assistance, Minimum assistance (x2 person)  Comments 1: lateral side steps towards HOB    Sitting Balance:  Static Sitting Balance  Static Sitting-Level of Assistance: Contact guard  Dynamic Sitting Balance  Dynamic Sitting-Level of Assistance: Contact guard    Standing Balance:  Static Standing Balance  Static Standing-Level of Assistance: Minimum assistance  Dynamic Standing Balance  Dynamic Standing-Level of Assistance: Minimum assistance (x2 person)      Vision:Vision - Basic Assessment  Current Vision: No visual deficits   and Vision - Complex Assessment  Tracking: WFL  Sensation:  Light Touch: No apparent deficits  Proprioception: No apparent deficits  Strength:  Strength Comments: BUE grossly 4/5 based on functional observations  Perception:  Inattention/Neglect: Appears intact  Initiation: Appears intact  Motor Planning: Appears intact  Coordination:  Movements are Fluid and Coordinated: Yes   Hand Function:  Gross Grasp: Functional  Coordination: Functional  Extremities: RUE   RUE : Within Functional Limits and LUE   LUE: Within Functional Limits    Outcome Measures:[facility] Daily Activity  Putting on and taking off regular lower body clothing: A lot  Bathing (including washing,  rinsing, drying): A lot  Putting on and taking off regular upper body clothing: A lot  Toileting, which includes using toilet, bedpan or urinal: A lot  Taking care of personal grooming such as brushing teeth: A little  Eating Meals: A little  Daily Activity - Total Score: 14      Education Documentation  Body Mechanics, taught by Enid Shaw, OT at 12/1/2024  2:21 PM.  Learner: Patient  Readiness: Acceptance  Method: Explanation  Response: Needs Reinforcement    Precautions, taught by Enid Shaw OT at 12/1/2024  2:21 PM.  Learner: Patient  Readiness: Acceptance  Method: Explanation  Response: Needs Reinforcement    ADL Training, taught by Enid Shaw OT at 12/1/2024  2:21 PM.  Learner: Patient  Readiness: Acceptance  Method: Explanation  Response: Needs Reinforcement    Education Comments  No comments found.           Goals:  Encounter Problems       Encounter Problems (Active)       ADLs       Patient with complete upper body dressing with independent level of assistance donning shirt w/out fasteners. (Progressing)       Start:  12/01/24    Expected End:  12/15/24            Patient with complete lower body dressing with modified independent level of assistance donning pants without a zipper/fasteners, socks, and shoes with PRN adaptive equipment. (Progressing)       Start:  12/01/24    Expected End:  12/15/24            Patient will complete toileting including hygiene clothing management/hygiene with modified independent level of assistance and prn adaptive equipment/LRD. (Progressing)       Start:  12/01/24    Expected End:  12/15/24               BALANCE       Pt will maintain dynamic standing balance during I/ADL task with modified independent level of assistance and LRD prn in order to demonstrate decreased risk of falling and improved postural control. (Progressing)       Start:  12/01/24    Expected End:  12/15/24               MOBILITY       Patient will perform Functional mobility max Household  distances with modified independent level of assistance and least restrictive device in order to improve safety and functional mobility. (Progressing)       Start:  12/01/24    Expected End:  12/15/24               TRANSFERS       Patient will perform bed mobility independent level of assistance in order to improve safety and independence with mobility (Progressing)       Start:  12/01/24    Expected End:  12/15/24            Patient will complete functional transfer to/from toilet and chair with least restrictive device as needed with modified independent level of assistance. (Progressing)       Start:  12/01/24    Expected End:  12/15/24

## 2024-12-02 LAB
ALBUMIN SERPL BCP-MCNC: 3.2 G/DL (ref 3.4–5)
ANION GAP SERPL CALC-SCNC: 11 MMOL/L (ref 10–20)
APTT PPP: 31 SECONDS (ref 27–38)
BLOOD EXPIRATION DATE: NORMAL
BUN SERPL-MCNC: 8 MG/DL (ref 6–23)
CALCIUM SERPL-MCNC: 8.4 MG/DL (ref 8.6–10.6)
CHLORIDE SERPL-SCNC: 111 MMOL/L (ref 98–107)
CO2 SERPL-SCNC: 22 MMOL/L (ref 21–32)
CREAT SERPL-MCNC: 0.46 MG/DL (ref 0.5–1.05)
DISPENSE STATUS: NORMAL
EGFRCR SERPLBLD CKD-EPI 2021: >90 ML/MIN/1.73M*2
ERYTHROCYTE [DISTWIDTH] IN BLOOD BY AUTOMATED COUNT: 13.3 % (ref 11.5–14.5)
GLUCOSE SERPL-MCNC: 116 MG/DL (ref 74–99)
HCT VFR BLD AUTO: 34.7 % (ref 36–46)
HGB BLD-MCNC: 10.7 G/DL (ref 12–16)
INR PPP: 1.1 (ref 0.9–1.1)
MAGNESIUM SERPL-MCNC: 3.07 MG/DL (ref 1.6–2.4)
MCH RBC QN AUTO: 30.1 PG (ref 26–34)
MCHC RBC AUTO-ENTMCNC: 30.8 G/DL (ref 32–36)
MCV RBC AUTO: 98 FL (ref 80–100)
NRBC BLD-RTO: 0 /100 WBCS (ref 0–0)
PHOSPHATE SERPL-MCNC: 1.8 MG/DL (ref 2.5–4.9)
PLATELET # BLD AUTO: 244 X10*3/UL (ref 150–450)
POTASSIUM SERPL-SCNC: 4.1 MMOL/L (ref 3.5–5.3)
PRODUCT BLOOD TYPE: 6200
PRODUCT CODE: NORMAL
PROTHROMBIN TIME: 12 SECONDS (ref 9.8–12.8)
RBC # BLD AUTO: 3.56 X10*6/UL (ref 4–5.2)
SODIUM SERPL-SCNC: 140 MMOL/L (ref 136–145)
UNIT ABO: NORMAL
UNIT NUMBER: NORMAL
UNIT RH: NORMAL
UNIT VOLUME: 350
WBC # BLD AUTO: 10 X10*3/UL (ref 4.4–11.3)
XM INTEP: NORMAL

## 2024-12-02 PROCEDURE — 37799 UNLISTED PX VASCULAR SURGERY: CPT

## 2024-12-02 PROCEDURE — 2500000001 HC RX 250 WO HCPCS SELF ADMINISTERED DRUGS (ALT 637 FOR MEDICARE OP): Performed by: STUDENT IN AN ORGANIZED HEALTH CARE EDUCATION/TRAINING PROGRAM

## 2024-12-02 PROCEDURE — 83735 ASSAY OF MAGNESIUM: CPT

## 2024-12-02 PROCEDURE — 94640 AIRWAY INHALATION TREATMENT: CPT

## 2024-12-02 PROCEDURE — 2500000004 HC RX 250 GENERAL PHARMACY W/ HCPCS (ALT 636 FOR OP/ED)

## 2024-12-02 PROCEDURE — 2060000001 HC INTERMEDIATE ICU ROOM DAILY

## 2024-12-02 PROCEDURE — 2500000002 HC RX 250 W HCPCS SELF ADMINISTERED DRUGS (ALT 637 FOR MEDICARE OP, ALT 636 FOR OP/ED): Performed by: STUDENT IN AN ORGANIZED HEALTH CARE EDUCATION/TRAINING PROGRAM

## 2024-12-02 PROCEDURE — 80069 RENAL FUNCTION PANEL: CPT

## 2024-12-02 PROCEDURE — 2500000004 HC RX 250 GENERAL PHARMACY W/ HCPCS (ALT 636 FOR OP/ED): Performed by: STUDENT IN AN ORGANIZED HEALTH CARE EDUCATION/TRAINING PROGRAM

## 2024-12-02 PROCEDURE — 85027 COMPLETE CBC AUTOMATED: CPT

## 2024-12-02 PROCEDURE — 2500000001 HC RX 250 WO HCPCS SELF ADMINISTERED DRUGS (ALT 637 FOR MEDICARE OP): Performed by: NURSE PRACTITIONER

## 2024-12-02 PROCEDURE — 85610 PROTHROMBIN TIME: CPT

## 2024-12-02 RX ORDER — DIPHENHYDRAMINE HYDROCHLORIDE 50 MG/ML
25 INJECTION INTRAMUSCULAR; INTRAVENOUS ONCE
Status: COMPLETED | OUTPATIENT
Start: 2024-12-02 | End: 2024-12-02

## 2024-12-02 RX ORDER — COLESEVELAM 180 1/1
1875 TABLET ORAL
Status: DISCONTINUED | OUTPATIENT
Start: 2024-12-03 | End: 2024-12-06 | Stop reason: HOSPADM

## 2024-12-02 RX ORDER — MAGNESIUM SULFATE HEPTAHYDRATE 40 MG/ML
2 INJECTION, SOLUTION INTRAVENOUS ONCE
Status: COMPLETED | OUTPATIENT
Start: 2024-12-02 | End: 2024-12-02

## 2024-12-02 RX ORDER — DEXAMETHASONE SODIUM PHOSPHATE 10 MG/ML
10 INJECTION INTRAMUSCULAR; INTRAVENOUS ONCE
Status: COMPLETED | OUTPATIENT
Start: 2024-12-02 | End: 2024-12-02

## 2024-12-02 RX ORDER — COLESEVELAM 180 1/1
1875 TABLET ORAL ONCE
Status: COMPLETED | OUTPATIENT
Start: 2024-12-02 | End: 2024-12-02

## 2024-12-02 ASSESSMENT — PAIN - FUNCTIONAL ASSESSMENT
PAIN_FUNCTIONAL_ASSESSMENT: 0-10

## 2024-12-02 ASSESSMENT — PAIN SCALES - GENERAL
PAINLEVEL_OUTOF10: 3
PAINLEVEL_OUTOF10: 0 - NO PAIN
PAINLEVEL_OUTOF10: 3
PAINLEVEL_OUTOF10: 9
PAINLEVEL_OUTOF10: 9
PAINLEVEL_OUTOF10: 5 - MODERATE PAIN
PAINLEVEL_OUTOF10: 0 - NO PAIN
PAINLEVEL_OUTOF10: 7
PAINLEVEL_OUTOF10: 8

## 2024-12-02 ASSESSMENT — PAIN DESCRIPTION - LOCATION: LOCATION: HEAD

## 2024-12-02 ASSESSMENT — PAIN DESCRIPTION - DESCRIPTORS: DESCRIPTORS: ACHING

## 2024-12-02 NOTE — PROGRESS NOTES
Spiritual Care Visit    Clinical Encounter Type  Visited With: Patient and family together  Routine Visit: Introduction  Continue Visiting: Yes    Baptism Encounters  Baptism Needs: Prayer         Sacramental Encounters  Sacrament of Sick-Anointing: Anointed    Patient received the Sacrament of the Anointing of the Sick by Fr. Jamari Desai,  [place] .   was present.     Within the [employer]-ism the Sacrament of the Sick, also known as the Anointing of the Sick, is a prayer in which we invoke the healing power of God.  Although considered part of 'Last Rites' the Anointing of the Sick, along with Eucharist and Reconciliation, is a repeatable sacrament and should be received by anyone about to undergo surgery, those in recovery and the elderly.  Those who are actively dying should receive the Anointing of the Sick for physical and spiritual healing.

## 2024-12-02 NOTE — CARE PLAN
The patient's goals for the shift include        Problem: Pain - Adult  Goal: Verbalizes/displays adequate comfort level or baseline comfort level  Outcome: Progressing     Problem: Safety - Adult  Goal: Free from fall injury  Outcome: Progressing     Problem: Discharge Planning  Goal: Discharge to home or other facility with appropriate resources  Outcome: Progressing     Problem: Chronic Conditions and Co-morbidities  Goal: Patient's chronic conditions and co-morbidity symptoms are monitored and maintained or improved  Outcome: Progressing     Problem: Skin  Goal: Decreased wound size/increased tissue granulation at next dressing change  Outcome: Progressing  Flowsheets (Taken 12/1/2024 1943)  Decreased wound size/increased tissue granulation at next dressing change:   Promote sleep for wound healing   Utilize specialty bed per algorithm  Goal: Participates in plan/prevention/treatment measures  Outcome: Progressing  Flowsheets (Taken 12/1/2024 1943)  Participates in plan/prevention/treatment measures: Elevate heels  Goal: Prevent/manage excess moisture  Outcome: Progressing  Flowsheets (Taken 12/1/2024 1943)  Prevent/manage excess moisture:   Monitor for/manage infection if present   Cleanse incontinence/protect with barrier cream   Moisturize dry skin  Goal: Prevent/minimize sheer/friction injuries  Outcome: Progressing  Flowsheets (Taken 12/1/2024 1943)  Prevent/minimize sheer/friction injuries:   HOB 30 degrees or less   Complete micro-shifts as needed if patient unable. Adjust patient position to relieve pressure points, not a full turn   Turn/reposition every 2 hours/use positioning/transfer devices   Utilize specialty bed per algorithm   Use pull sheet  Goal: Promote/optimize nutrition  Outcome: Progressing  Flowsheets (Taken 12/1/2024 1943)  Promote/optimize nutrition:   Assist with feeding   Discuss with provider if NPO > 2 days   Monitor/record intake including meals  Goal: Promote skin healing  Outcome:  Progressing  Flowsheets (Taken 12/1/2024 1943)  Promote skin healing:   Assess skin/pad under line(s)/device(s)   Turn/reposition every 2 hours/use positioning/transfer devices

## 2024-12-02 NOTE — CARE PLAN
The clinical goals for the shift include pt will keep pain less than a 7 throughtout the shift      Problem: Pain - Adult  Goal: Verbalizes/displays adequate comfort level or baseline comfort level  Outcome: Progressing     Problem: Safety - Adult  Goal: Free from fall injury  Outcome: Progressing     Problem: Discharge Planning  Goal: Discharge to home or other facility with appropriate resources  Outcome: Progressing     Problem: Chronic Conditions and Co-morbidities  Goal: Patient's chronic conditions and co-morbidity symptoms are monitored and maintained or improved  Outcome: Progressing     Problem: Skin  Goal: Decreased wound size/increased tissue granulation at next dressing change  Outcome: Progressing  Flowsheets (Taken 12/2/2024 1100)  Decreased wound size/increased tissue granulation at next dressing change:   Promote sleep for wound healing   Protective dressings over bony prominences   Utilize specialty bed per algorithm  Goal: Participates in plan/prevention/treatment measures  Outcome: Progressing  Flowsheets (Taken 12/2/2024 1100)  Participates in plan/prevention/treatment measures:   Discuss with provider PT/OT consult   Elevate heels   Increase activity/out of bed for meals  Goal: Prevent/manage excess moisture  Outcome: Progressing  Flowsheets (Taken 12/2/2024 1100)  Prevent/manage excess moisture:   Cleanse incontinence/protect with barrier cream   Monitor for/manage infection if present   Follow provider orders for dressing changes   Moisturize dry skin   Use wicking fabric (obtain order)  Goal: Prevent/minimize sheer/friction injuries  Outcome: Progressing  Flowsheets (Taken 12/2/2024 1100)  Prevent/minimize sheer/friction injuries:   Complete micro-shifts as needed if patient unable. Adjust patient position to relieve pressure points, not a full turn   Increase activity/out of bed for meals   Use pull sheet   HOB 30 degrees or less   Utilize specialty bed per algorithm  Goal:  Promote/optimize nutrition  Outcome: Progressing  Flowsheets (Taken 12/2/2024 1100)  Promote/optimize nutrition:   Assist with feeding   Monitor/record intake including meals   Consume > 50% meals/supplements   Offer water/supplements/favorite foods   Discuss with provider if NPO > 2 days   Reassess MST if dietician not consulted  Goal: Promote skin healing  Outcome: Progressing  Flowsheets (Taken 12/2/2024 1100)  Promote skin healing:   Assess skin/pad under line(s)/device(s)   Turn/reposition every 2 hours/use positioning/transfer devices   Ensure correct size (line/device) and apply per  instructions   Protective dressings over bony prominences   Rotate device position/do not position patient on device

## 2024-12-02 NOTE — PROGRESS NOTES
Cristina Rivera is a 79 y.o. female on day 3 of admission presenting with Cerebral aneurysm ([facility]-HCC).    Subjective   No acute events overnight. Doing well this AM       Objective     Physical Exam  Aox3  both eyes 3R, EOMI  Face symmetric  Fcx4 5/5  SILT  Incision c/d/i    Last Recorded Vitals  Blood pressure 145/83, pulse 86, temperature 36.5 °C (97.7 °F), resp. rate 22, weight (!) 44.6 kg (98 lb 5.2 oz), SpO2 96%.  Intake/Output last 3 Shifts:  I/O last 3 completed shifts:  In: 1550 (34.8 mL/kg) [I.V.:1300 (29.1 mL/kg); IV Piggyback:250]  Out: 2310 (51.8 mL/kg) [Urine:2310 (1.4 mL/kg/hr)]  Weight: 44.6 kg     Relevant Results                 No results found for this or any previous visit (from the past 24 hours).                   Assessment/Plan   Assessment & Plan  Cerebral aneurysm ([facility]-HCC)    Asthma    Gastroesophageal reflux disease    Cristina Perez is a 79 yr old F with H/o HTN, HLD, GERD, aortic stenosis, lymphocytic colitis, hyperparaT, L ophthalmic aneurysm s/p coil (2002, OSH), s/p L ICA coiling (2002, OSH), p/w newly found acomm aneurysm, 11/29 s/p R crani for acomm aneurysm clipping   12/1 SGD dc'd    Plan:  tele  -160  IO (euvolemic)  PTOT-rehab  Medically ready for discharge      Jonathan Reyes MD

## 2024-12-02 NOTE — PROGRESS NOTES
Social Work Discharge Planning note:    -Patient discussed during interdisciplinary rounds.   -Team members present: NP, PA, and SW  -Plan per medical team: Pt is medically ready for discharge to acute rehab placement.   -Payer: [employer]  -Status: Inpatient  -Discharge disposition: Pt is currently with PT and OT recommendations for High intensity. SW met with Pt and her , Sukhjinder, to further discuss discharge planing. Pt and Sukhjinder were thinking that Pt would return home at discharge with outpatient PT/OT through [facility] [place] in [place]. Pt and Sukhjinder think they may be agreeable with an inpatient rehab placement at [facility] [place], but they want to talk with their daughters (who are RN's) before making any final decisions. Pt was ok with a referral being made to [facility] [place] to start the process (referral made via [facility]). SW will continue to follow to assist with discharge planning.   -Support to Pt/family: Sukhjinder reported that he and family will provide for any needed assistance once Pt returns home. Pt and Sukhjinder reported no other issues or concerns. SW will continue to follow for emotional/incidental support to Pt/family.    -Potential Barriers: none  -Anticipated Date of Discharge:  12/4/24     BETO Dawson, LACEYW

## 2024-12-03 LAB
ALBUMIN SERPL BCP-MCNC: 3.5 G/DL (ref 3.4–5)
ANION GAP SERPL CALC-SCNC: 14 MMOL/L (ref 10–20)
APTT PPP: 39 SECONDS (ref 27–38)
BUN SERPL-MCNC: 12 MG/DL (ref 6–23)
CALCIUM SERPL-MCNC: 8.8 MG/DL (ref 8.6–10.6)
CHLORIDE SERPL-SCNC: 108 MMOL/L (ref 98–107)
CO2 SERPL-SCNC: 21 MMOL/L (ref 21–32)
CREAT SERPL-MCNC: 0.42 MG/DL (ref 0.5–1.05)
EGFRCR SERPLBLD CKD-EPI 2021: >90 ML/MIN/1.73M*2
ERYTHROCYTE [DISTWIDTH] IN BLOOD BY AUTOMATED COUNT: 13.2 % (ref 11.5–14.5)
GLUCOSE SERPL-MCNC: 116 MG/DL (ref 74–99)
HCT VFR BLD AUTO: 34.8 % (ref 36–46)
HGB BLD-MCNC: 10.8 G/DL (ref 12–16)
INR PPP: 1 (ref 0.9–1.1)
MAGNESIUM SERPL-MCNC: 2.19 MG/DL (ref 1.6–2.4)
MCH RBC QN AUTO: 30.4 PG (ref 26–34)
MCHC RBC AUTO-ENTMCNC: 31 G/DL (ref 32–36)
MCV RBC AUTO: 98 FL (ref 80–100)
NRBC BLD-RTO: 0 /100 WBCS (ref 0–0)
PHOSPHATE SERPL-MCNC: 2.4 MG/DL (ref 2.5–4.9)
PLATELET # BLD AUTO: 255 X10*3/UL (ref 150–450)
POTASSIUM SERPL-SCNC: 4.2 MMOL/L (ref 3.5–5.3)
PROTHROMBIN TIME: 11.7 SECONDS (ref 9.8–12.8)
RBC # BLD AUTO: 3.55 X10*6/UL (ref 4–5.2)
SODIUM SERPL-SCNC: 139 MMOL/L (ref 136–145)
WBC # BLD AUTO: 9.7 X10*3/UL (ref 4.4–11.3)

## 2024-12-03 PROCEDURE — 97535 SELF CARE MNGMENT TRAINING: CPT | Mod: GO

## 2024-12-03 PROCEDURE — 97530 THERAPEUTIC ACTIVITIES: CPT | Mod: GO

## 2024-12-03 PROCEDURE — 2500000004 HC RX 250 GENERAL PHARMACY W/ HCPCS (ALT 636 FOR OP/ED)

## 2024-12-03 PROCEDURE — 2500000004 HC RX 250 GENERAL PHARMACY W/ HCPCS (ALT 636 FOR OP/ED): Performed by: STUDENT IN AN ORGANIZED HEALTH CARE EDUCATION/TRAINING PROGRAM

## 2024-12-03 PROCEDURE — 80069 RENAL FUNCTION PANEL: CPT

## 2024-12-03 PROCEDURE — 2500000001 HC RX 250 WO HCPCS SELF ADMINISTERED DRUGS (ALT 637 FOR MEDICARE OP): Performed by: NURSE PRACTITIONER

## 2024-12-03 PROCEDURE — 2500000001 HC RX 250 WO HCPCS SELF ADMINISTERED DRUGS (ALT 637 FOR MEDICARE OP): Performed by: STUDENT IN AN ORGANIZED HEALTH CARE EDUCATION/TRAINING PROGRAM

## 2024-12-03 PROCEDURE — 1100000001 HC PRIVATE ROOM DAILY

## 2024-12-03 PROCEDURE — 2500000002 HC RX 250 W HCPCS SELF ADMINISTERED DRUGS (ALT 637 FOR MEDICARE OP, ALT 636 FOR OP/ED): Performed by: STUDENT IN AN ORGANIZED HEALTH CARE EDUCATION/TRAINING PROGRAM

## 2024-12-03 PROCEDURE — 36415 COLL VENOUS BLD VENIPUNCTURE: CPT

## 2024-12-03 PROCEDURE — 85027 COMPLETE CBC AUTOMATED: CPT

## 2024-12-03 PROCEDURE — 97112 NEUROMUSCULAR REEDUCATION: CPT | Mod: GP

## 2024-12-03 PROCEDURE — 97116 GAIT TRAINING THERAPY: CPT | Mod: GP

## 2024-12-03 PROCEDURE — 94640 AIRWAY INHALATION TREATMENT: CPT

## 2024-12-03 PROCEDURE — 85730 THROMBOPLASTIN TIME PARTIAL: CPT

## 2024-12-03 PROCEDURE — 83735 ASSAY OF MAGNESIUM: CPT

## 2024-12-03 PROCEDURE — 2500000001 HC RX 250 WO HCPCS SELF ADMINISTERED DRUGS (ALT 637 FOR MEDICARE OP)

## 2024-12-03 RX ORDER — ACETAMINOPHEN 325 MG/1
975 TABLET ORAL EVERY 6 HOURS PRN
Status: CANCELLED
Start: 2024-12-03

## 2024-12-03 RX ORDER — POLYETHYLENE GLYCOL 3350 17 G/17G
17 POWDER, FOR SOLUTION ORAL DAILY
Status: CANCELLED
Start: 2024-12-03

## 2024-12-03 RX ORDER — OXYCODONE HYDROCHLORIDE 5 MG/1
5 TABLET ORAL EVERY 6 HOURS PRN
Qty: 15 TABLET | Refills: 0 | Status: CANCELLED
Start: 2024-12-03

## 2024-12-03 RX ORDER — AMOXICILLIN 250 MG
2 CAPSULE ORAL 2 TIMES DAILY
Status: DISCONTINUED | OUTPATIENT
Start: 2024-12-03 | End: 2024-12-06 | Stop reason: HOSPADM

## 2024-12-03 RX ORDER — POLYETHYLENE GLYCOL 3350 17 G/17G
17 POWDER, FOR SOLUTION ORAL 2 TIMES DAILY
Status: DISCONTINUED | OUTPATIENT
Start: 2024-12-03 | End: 2024-12-06 | Stop reason: HOSPADM

## 2024-12-03 RX ORDER — LEVETIRACETAM 500 MG/1
500 TABLET ORAL 2 TIMES DAILY
Qty: 6 TABLET | Refills: 0 | Status: CANCELLED
Start: 2024-12-03 | End: 2024-12-06

## 2024-12-03 ASSESSMENT — PAIN SCALES - GENERAL
PAINLEVEL_OUTOF10: 6
PAINLEVEL_OUTOF10: 8
PAINLEVEL_OUTOF10: 0 - NO PAIN
PAINLEVEL_OUTOF10: 8
PAINLEVEL_OUTOF10: 0 - NO PAIN
PAINLEVEL_OUTOF10: 10 - WORST POSSIBLE PAIN
PAINLEVEL_OUTOF10: 0 - NO PAIN
PAINLEVEL_OUTOF10: 8
PAINLEVEL_OUTOF10: 8
PAINLEVEL_OUTOF10: 2
PAINLEVEL_OUTOF10: 9
PAINLEVEL_OUTOF10: 0 - NO PAIN

## 2024-12-03 ASSESSMENT — COGNITIVE AND FUNCTIONAL STATUS - GENERAL
DRESSING REGULAR UPPER BODY CLOTHING: A LITTLE
DAILY ACTIVITIY SCORE: 20
MOBILITY SCORE: 22
TURNING FROM BACK TO SIDE WHILE IN FLAT BAD: A LITTLE
CLIMB 3 TO 5 STEPS WITH RAILING: A LITTLE
MOVING FROM LYING ON BACK TO SITTING ON SIDE OF FLAT BED WITH BEDRAILS: A LITTLE
DAILY ACTIVITIY SCORE: 20
TOILETING: A LITTLE
DRESSING REGULAR LOWER BODY CLOTHING: A LITTLE
MOBILITY SCORE: 18
STANDING UP FROM CHAIR USING ARMS: A LITTLE
STANDING UP FROM CHAIR USING ARMS: A LITTLE
DRESSING REGULAR LOWER BODY CLOTHING: A LITTLE
PERSONAL GROOMING: A LITTLE
TOILETING: A LITTLE
MOVING TO AND FROM BED TO CHAIR: A LITTLE
DRESSING REGULAR UPPER BODY CLOTHING: A LITTLE
MOVING TO AND FROM BED TO CHAIR: A LITTLE
HELP NEEDED FOR BATHING: A LITTLE
WALKING IN HOSPITAL ROOM: A LITTLE

## 2024-12-03 ASSESSMENT — PAIN - FUNCTIONAL ASSESSMENT
PAIN_FUNCTIONAL_ASSESSMENT: 0-10

## 2024-12-03 ASSESSMENT — PAIN DESCRIPTION - DESCRIPTORS: DESCRIPTORS: ACHING

## 2024-12-03 ASSESSMENT — ACTIVITIES OF DAILY LIVING (ADL): HOME_MANAGEMENT_TIME_ENTRY: 30

## 2024-12-03 NOTE — PROGRESS NOTES
Social Work Discharge Planning note:    -Patient discussed during interdisciplinary rounds.   -Team members present: PA, NP, and SW  -Plan per medical team: Pt is medically ready for discharge.   -Payer: [employer]  -Status: Inpatient  -Discharge disposition: DAVID met with Pt and family this morning and a few subsequent times this afternoon to follow up with discharge planning. DAVID just met with Pt and her , Sukhjinder, together. Pt continues to want out patient, but she was agreeable with Sukhjinder's decision [facility]. SW requested for insurance precert to be started.   -Anticipated Date of Discharge:  12/5/24    BETO Dawson, LSW

## 2024-12-03 NOTE — CARE PLAN
The patient's goals for the shift include      The clinical goals for the shift include remain neurologically and hemodynamically stable.      Problem: Pain - Adult  Goal: Verbalizes/displays adequate comfort level or baseline comfort level  Outcome: Progressing     Problem: Safety - Adult  Goal: Free from fall injury  Outcome: Progressing     Problem: Chronic Conditions and Co-morbidities  Goal: Patient's chronic conditions and co-morbidity symptoms are monitored and maintained or improved  Outcome: Progressing     Problem: Skin  Goal: Participates in plan/prevention/treatment measures  Outcome: Progressing  Flowsheets (Taken 12/2/2024 2304)  Participates in plan/prevention/treatment measures:   Elevate heels   Increase activity/out of bed for meals  Goal: Prevent/minimize sheer/friction injuries  Outcome: Progressing  Flowsheets (Taken 12/2/2024 2304)  Prevent/minimize sheer/friction injuries:   Use pull sheet   Increase activity/out of bed for meals   HOB 30 degrees or less   Turn/reposition every 2 hours/use positioning/transfer devices

## 2024-12-03 NOTE — PROGRESS NOTES
Cristina Rivera is a 79 y.o. female on day 4 of admission presenting with Cerebral aneurysm ([facility]).    Subjective   Had headache overnight with min improvement w PO meds    Objective     Physical Exam  AOx3  both eyes 3R, EOMI  Face symmetric  Fcx4 5/5  SILT  Incision c/d/i    Last Recorded Vitals  Blood pressure 131/63, pulse 66, temperature 37.4 °C (99.3 °F), temperature source Temporal, resp. rate 12, weight 52.2 kg (115 lb), SpO2 94%.  Intake/Output last 3 Shifts:  I/O last 3 completed shifts:  In: 1381.7 (26.5 mL/kg) [P.O.:390; IV Piggyback:991.7]  Out: 1450 (27.8 mL/kg) [Urine:1450 (0.8 mL/kg/hr)]  Weight: 52.2 kg     Relevant Results                 Results for orders placed or performed during the hospital encounter of 11/29/24 (from the past 24 hours)   CBC   Result Value Ref Range    WBC 9.7 4.4 - 11.3 x10*3/uL    nRBC 0.0 0.0 - 0.0 /100 WBCs    RBC 3.55 (L) 4.00 - 5.20 x10*6/uL    Hemoglobin 10.8 (L) 12.0 - 16.0 g/dL    Hematocrit 34.8 (L) 36.0 - 46.0 %    MCV 98 80 - 100 fL    MCH 30.4 26.0 - 34.0 pg    MCHC 31.0 (L) 32.0 - 36.0 g/dL    RDW 13.2 11.5 - 14.5 %    Platelets 255 150 - 450 x10*3/uL   Coagulation Screen   Result Value Ref Range    Protime 11.7 9.8 - 12.8 seconds    INR 1.0 0.9 - 1.1    aPTT 39 (H) 27 - 38 seconds   Magnesium   Result Value Ref Range    Magnesium 2.19 1.60 - 2.40 mg/dL   Renal Function Panel   Result Value Ref Range    Glucose 116 (H) 74 - 99 mg/dL    Sodium 139 136 - 145 mmol/L    Potassium 4.2 3.5 - 5.3 mmol/L    Chloride 108 (H) 98 - 107 mmol/L    Bicarbonate 21 21 - 32 mmol/L    Anion Gap 14 10 - 20 mmol/L    Urea Nitrogen 12 6 - 23 mg/dL    Creatinine 0.42 (L) 0.50 - 1.05 mg/dL    eGFR >90 >60 mL/min/1.73m*2    Calcium 8.8 8.6 - 10.6 mg/dL    Phosphorus 2.4 (L) 2.5 - 4.9 mg/dL    Albumin 3.5 3.4 - 5.0 g/dL                      Assessment/Plan   Assessment & Plan  Cerebral aneurysm ([facility])    Asthma    Gastroesophageal reflux disease    Cristina Jeromemanishca is a 79 yr old  F with H/o HTN, HLD, GERD, aortic stenosis, lymphocytic colitis, hyperparaT, L ophthalmic aneurysm s/p coil (2002, OSH), s/p L ICA coiling (2002, OSH), p/w newly found acomm aneurysm, 11/29 s/p R crani for acomm aneurysm clipping   12/1 SGD dc'd    Plan:  floor  -160  Strict IO  Start dex for HA  Con't keppra until 12/6  PTOT-rehab  SCDs, SQH    Medically ready for discharge      Barrie Menchaca MD

## 2024-12-03 NOTE — PROGRESS NOTES
Physical Therapy    Physical Therapy Treatment    Patient Name: Cristina Rivera  MRN: [id number]  Today's Date: 12/3/2024  Time Calculation  Start Time: 1151  Stop Time: 1215  Time Calculation (min): 24 min       Assessment/Plan   PT Assessment  PT Assessment Results: Decreased strength, Decreased endurance, Impaired balance, Decreased mobility, Decreased safety awareness, Pain  Rehab Prognosis: Excellent  Barriers to Discharge: Pain  Evaluation/Treatment Tolerance: Patient limited by pain  End of Session Communication: Bedside nurse  Assessment Comment: Pt to benefit from ongoing PT services to address the above limitaitons and to prepare pt for timely return to prior level of function.  End of Session Patient Position: Bed, 3 rail up, Alarm on  PT Plan  Inpatient/Swing Bed or Outpatient: Inpatient  PT Plan  Treatment/Interventions: Bed mobility, Transfer training, Gait training, Balance training, Neuromuscular re-education, Stair training, Strengthening, Therapeutic exercise, Therapeutic activity, Home exercise program, Positioning  PT Plan: Ongoing PT  PT Frequency: 4 times per week  PT Discharge Recommendations:  (If 24/7 supervision available at home, recommend low intensity PT services with 24/7 supervision. If this is not available, recommend high intensity PT services at d/c.)  Equipment Recommended upon Discharge: Wheeled walker  PT Recommended Transfer Status: Assist x1, Assistive device  PT - OK to Discharge: Yes (When medically ready)      General Visit Information:   PT  Visit  PT Received On: 12/03/24  Response to Previous Treatment: Patient with no complaints from previous session.  Reason for Referral: s/p R crani for aneurysmal clipping (11/29)  Past Medical History Relevant to Rehab: HTN, HLD, GERD, aortic stenosis, lymphocytic colitis, hyperparaT, L ophthalmic aneurysm s/p coil (2002, OSH), s/p L ICA coiling (2002, OSH)  Co-Treatment:  (n/a)  Co-Treatment Reason: n/a  Prior to Session  Communication: Bedside nurse  Patient Position Received: Up in chair, Alarm on  Family/Caregiver Present: Yes  Caregiver Feedback: Pt's daughter present and supportive.  General Comment: Pt pleasant and cooperative     Subjective   Precautions:  Precautions  Medical Precautions: Fall precautions  Precautions Comment: SBP <160    Vital Signs:  Vital Signs (Past 2hrs)        Date/Time Vitals Session Patient Position Pulse Resp SpO2 BP MAP (mmHg)    12/03/24 1040 --  --  68  17  --  138/105  116     12/03/24 1100 --  --  76  15  --  128/81  96     12/03/24 1151 Pre PT  Sitting  72  --  99 %  122/59  79     12/03/24 1201 During PT  Sitting  79  --  99 %  138/71  90     12/03/24 1215 Post PT  Lying  63  --  99 %  134/67  88                     Objective   Pain:  Pain Assessment  Pain Assessment: 0-10  0-10 (Numeric) Pain Score: 8  Pain Type: Acute pain  Pain Location: Head  Pain Interventions:  (RN notified of this.)  Cognition:  Cognition  Overall Cognitive Status: Impaired  Orientation Level: Oriented X4  Following Commands: Follows multistep commands with repetition  Safety Judgment: Decreased awareness of need for assistance  Insight: Mild  Impulsive: Mildly  Processing Speed: Delayed    Lines/Tubes/Drains: Tele    Continuous Medications/Drips: n/a       Oxygen: Room air     Postural Control:   Postural Control  Postural Control: Within Functional Limits  Head Control: WFL  Trunk Control: WFL in sitting, mild lateral sway in ambulation  Righting Reactions: Intact  Protective Responses: Intact  Posture Comment: Rounded shoulders    Balance:   Static Sitting Balance  Static Sitting-Balance Support: Bilateral upper extremity supported, Feet supported  Static Sitting-Level of Assistance: Close supervision  Dynamic Sitting Balance  Dynamic Sitting-Balance Support: Bilateral upper extremity supported, Feet supported  Dynamic Sitting-Level of Assistance: Contact guard  Dynamic Sitting-Balance: Forward lean    Static  Standing Balance  Static Standing-Balance Support: No upper extremity supported  Static Standing-Level of Assistance: Contact guard  Dynamic Standing Balance  Dynamic Standing-Balance Support: Bilateral upper extremity supported, Right upper extremity supported, No upper extremity supported  Dynamic Standing-Level of Assistance:  (CGA to MIN A x1)  Dynamic Standing-Balance: Reaching for objects, Forward lean, Tandem stance, Turning  Dynamic Standing-Comments: Gait belt.    Extremity/Trunk Assessments:  Strength:       RUE   RUE : Within Functional Limits    LUE   LUE: Within Functional Limits    RLE   RLE : Within Functional Limits    LLE   LLE : Within Functional Limits    PT Treatments:       Therapeutic Activity  Therapeutic Activity Performed: No    Balance/Neuromuscular Re-Education  Balance/Neuromuscular Re-Education Activity Performed: Yes  Balance/Neuromuscular Re-Education Activity 1: Reaching activity with RUE and RLE standing at sink, reaching for objects at therapist's cue. Pt then instructed to put all items back in their places without cues from therapist. Gait belt + CGA for safety, with 1 to 2 UE support of countertop.  Balance/Neuromuscular Re-Education Activity 2: Tandem stepping in hallway with gait belt. Pt achieves 3 steps before R lateral loss of balance requiring MIN A x1 + stepping strategy to retain balance.    Bed Mobility  Bed Mobility: Yes  Bed Mobility 1  Bed Mobility 1: Sitting to supine  Level of Assistance 1: Contact guard  Bed Mobility Comments 1: Cues for proper task sequencing.  Bed Mobility 2  Bed Mobility  2: Scooting (Boosting)  Level of Assistance 2: Dependent, +2  Bed Mobility Comments 2: HOB flat. Used draw sheet.    Ambulation/Gait Training  Ambulation/Gait Training Performed: Yes  Ambulation/Gait Training 1  Surface 1: Level tile  Device 1: No device  Gait Support Devices: Gait belt  Assistance 1: Contact guard  Quality of Gait 1: Narrow base of support, Decreased step  length, Forward flexed posture (Able to stop suddenly and turn 180 deg (slowly) when cued without loss of balance or excessive sway. Gait speed and beau significantly decreased.)  Comments/Distance (ft) 1: 75 ft    Transfers  Transfer: Yes  Transfer 1  Transfer From 1: Chair with arms to  Transfer to 1: Stand  Technique 1: Stand to sit, Sit to stand  Transfer Device 1: Gait belt  Transfer Level of Assistance 1: Contact guard  Trials/Comments 1: 2x with cues for proper hand placement and controlling speed.    Stairs  Stairs: No              Outcome Measures:  [facility] Basic Mobility  Turning from your back to your side while in a flat bed without using bedrails: A little  Moving from lying on your back to sitting on the side of a flat bed without using bedrails: A little  Moving to and from bed to chair (including a wheelchair): A little  Standing up from a chair using your arms (e.g. wheelchair or bedside chair): A little  To walk in hospital room: A little  Climbing 3-5 steps with railing: A little  Basic Mobility - Total Score: 18                   FSS-ICU  Ambulation: Walks >/ or equal to 50 feet with any assistance x1  Rolling: Supervision or set-up only  Sitting: Supervision or set-up only  Transfer Sit-to-Stand: Minimal assistance (performs 75% or more of task)  Transfer Supine-to-Sit: Minimal assistance (performs 75% or more of task)  Total Score: 20    ICU Mobility Screen  Early Mobility/Exercise Safety Screen: Proceed with mobilization - No exclusion criteria met  ICU Mobility Scale: Walking with assistance of 1 person    [place]  Sitting Balance: Steady, safe  Arises: Able, uses arms to help  Attempts to Arise: Able to arise, one attempt  Immediate Standing Balance (First 5 Seconds): Steady without walker or other support  Standing Balance: Narrow stance without support  Nudged: Begins to fall  Eyes Closed: Unsteady  Turned 360 Degrees: Steadiness: Steady  Turned 360 Degrees: Continuity of Steps:  Discontinuous steps  Sitting Down: Uses arms or not a smooth motion  Balance Score: 10  Initiation of Gait: No hesitancy  Step Height: R Swing Foot: Right foot complete clears floor  Step Length: R Swing Foot: Passes left stance foot  Step Height: L Swing Foot: Left foot complete clears floor  Step Length: L Swing Foot: Passes right stance foot  Step Symmetry: Right and left step appear equal  Step Continuity: Steps appear continuous  Path: Mild/moderate deviation or uses walking aid  Trunk: No sway but flexion of knees or back or spreads arms out while walking  Walking Time: Heels apart  Gait Score: 9  Total Score: 19/28 indicates moderate falls risk              Education:  Education Documentation  Precautions, taught by Carrie Black, PT at 12/3/2024 12:28 PM.  Learner: Family, Patient  Readiness: Acceptance  Method: Explanation  Response: Verbalizes Understanding  Comment: PT d/c recs, PT plan of care, mobility precautions.    Body Mechanics, taught by Carrie Black, PT at 12/3/2024 12:28 PM.  Learner: Family, Patient  Readiness: Acceptance  Method: Explanation  Response: Verbalizes Understanding  Comment: PT d/c recs, PT plan of care, mobility precautions.    Mobility Training, taught by Carrie Black, PT at 12/3/2024 12:28 PM.  Learner: Family, Patient  Readiness: Acceptance  Method: Explanation  Response: Verbalizes Understanding  Comment: PT d/c recs, PT plan of care, mobility precautions.    Education Comments  No comments found.             Encounter Problems       Encounter Problems (Active)       PT Problem       Pt will perform supine<>sit mod I (Progressing)       Start:  12/01/24    Expected End:  12/15/24            Pt will perform sit<>stand with LRAD mod I (Progressing)       Start:  12/01/24    Expected End:  12/15/24            Pt will amb 150ft with LRAD mod I (Progressing)       Start:  12/01/24    Expected End:  12/15/24            Pt will amb in hallway, while completing dual  task, without LOB (Progressing)       Start:  12/01/24    Expected End:  12/15/24               Pain - Adult                12/03/24 at 12:31 PM   Carrie Black, PT   Rehab Office: [phone]

## 2024-12-03 NOTE — PROGRESS NOTES
Occupational Therapy    OT Treatment    Patient Name: Cristina Rivera  MRN: [id number]  Department: [facility]  Room: 14/14-A  Today's Date: 12/3/2024  Time Calculation  Start Time: 1100  Stop Time: 1143  Time Calculation (min): 43 min        Assessment:  Evaluation/Treatment Tolerance: Patient tolerated treatment well  Medical Staff Made Aware: Yes  End of Session Communication: Bedside nurse  End of Session Patient Position: Bed, 3 rail up, Alarm off, not on at start of session  Evaluation/Treatment Tolerance: Patient tolerated treatment well  Medical Staff Made Aware: Yes  Plan:  Treatment Interventions: ADL retraining, Functional transfer training  OT Frequency: 3 times per week  OT Discharge Recommendations: High intensity level of continued care  OT Recommended Transfer Status: Assist of 2  OT - OK to Discharge: Yes (OT eval complete, refer to discharge recommendations provided)  Treatment Interventions: ADL retraining, Functional transfer training    Subjective   Previous Visit Info:  OT Last Visit  OT Received On: 12/03/24  General:  General  Reason for Referral: sp R crani for aneurysmal clipping (11/29)  Past Medical History Relevant to Rehab: HTN, HLD, GERD, aortic stenosis, lymphocytic colitis, hyperparaT, L ophthalmic aneurysm s/p coil (2002, OSH), s/p L ICA coiling (2002, OSH)  Family/Caregiver Present: Yes  Caregiver Feedback: Daughter present and supportive during therapy  Prior to Session Communication: Bedside nurse  Patient Position Received: Up in chair, Alarm off, not on at start of session  General Comment: Pt pleasant and agreeable to therapy.  Precautions:  Medical Precautions: Fall precautions  Precautions Comment: SBP < 160          Vital Signs     Vitals Session Pre OT During OT Post OT   Heart Rate 80 80s 76   Resp  18  15   SpO2 98 >98 98   /81  122/72   MAP 96 80-90 88   ICP         Lines:  Tele    Pain:  Pain Assessment  Pain Assessment: 0-10  0-10 (Numeric) Pain Score: 6  Pain Type:  "Acute pain  Pain Location: Head    Objective    Cognition:  Cognition  Overall Cognitive Status: Impaired (easily distracted, required cues to attend to task. Decreased insight and safety awareness.)  Orientation Level: Oriented X4  Following Commands:  (followed 1-2 step commands with some repetition)  Safety Judgment: Decreased awareness of need for assistance  Insight: Mild  Impulsive: Mildly  Processing Speed: Delayed  Cognition Test Scores  Cognition Tests: Cognition Test Performed  SBT Test Score: Pt completed SBT with pt scoring 17/28, with pt having error with time of day, 2 errors when stating months of the yearin reverseorder, and 5 errors with delayed recall, demonstrating \"impairment consistent with dementia\". Normal =0-4.  Coordination:     Activities of Daily Living:    Grooming:  Completed hand hygiene and brushing teeth in standing at W with CGA.    LE Dressing  LE Dressing: Yes  Pants Level of Assistance: Contact guard  Sock Level of Assistance: Close supervision  LE Dressing Comments: Pt doffed/donned socks seated in chair with SBA. Pt donned pants with CGA with cues for safety.    Toileting  Toileting Level of Assistance: Contact guard  Where Assessed: Toilet  Toileting Comments: pt completed clothing  management in standing with CGA and chele hygiene management with SBA in sitting.  Functional Standing Tolerance:     Bed Mobility/Transfers: Transfers  Transfer: Yes  Transfer 1  Technique 1: Sit to stand, Stand to sit  Transfer Device 1: Walker  Transfer Level of Assistance 1: Contact guard  Trials/Comments 1: completed 3trials with cues for proper hand placement.    Toilet Transfers  Toilet Transfer to: Standard toilet  Toilet Transfer Technique: Ambulating  Toilet Transfers: Contact guard  Toilet Transfers Comments: compelted transfer to toilet with cues for proper alignment of RW and self, and cues for proper hand placement.    Functional Mobility:  Functional Mobility  Functional Mobility " Performed: Yes  Functional Mobility 1  Surface 1: Level tile  Device 1: Rolling walker  Assistance 1: Contact guard  Comments 1: Pt trialed with functional mobility without AD with pt needing Min A for safety and balance.Pt trialed with FWW with pt completing with CGA for functional mobility, cues needed for walker management, safety, sequencing ,and balance.  Sitting Balance:  Static Sitting Balance  Static Sitting-Level of Assistance: Close supervision  Dynamic Sitting Balance  Dynamic Sitting-Level of Assistance: Contact guard  Standing Balance:  Static Standing Balance  Static Standing-Level of Assistance: Contact guard  Dynamic Standing Balance  Dynamic Standing-Level of Assistance: Contact guard    Therapy/Activity: Therapeutic Activity  Therapeutic Activity Performed: Yes  Therapeutic Activity 1: Pt completed 2 trials functional mobility with FWW with CGA and cues needed for walker management, safety, sequencing, and obstacle negotiation. Pt trialed functional mobiloity without AD with Min A with HHA with notable unsteadiness.      Outcome Measures:[facility] Daily Activity  Putting on and taking off regular lower body clothing: A little  Bathing (including washing, rinsing, drying): A little  Putting on and taking off regular upper body clothing: A little  Toileting, which includes using toilet, bedpan or urinal: A little  Taking care of personal grooming such as brushing teeth: None  Eating Meals: None  Daily Activity - Total Score: 20         and Early Mobility/Exercise Safety Screen: Proceed with mobilization - No exclusion criteria met  ICU Mobility Scale: Walking with assistance of 1 person [8]    Education Documentation  Body Mechanics, taught by Hattie Shepherd OT at 12/3/2024  4:10 PM.  Learner: Family, Patient  Readiness: Acceptance  Method: Explanation, Demonstration  Response: Needs Reinforcement, Verbalizes Understanding  Comment: safety [facility] FWW, discharge recommendations, role of OT,  POC    Precautions, taught by Hattie Shepherd OT at 12/3/2024  4:10 PM.  Learner: Family, Patient  Readiness: Acceptance  Method: Explanation, Demonstration  Response: Needs Reinforcement, Verbalizes Understanding  Comment: safety wtih FWW, discharge recommendations, role of OT, POC    ADL Training, taught by Hattie Shepherd, OT at 12/3/2024  4:10 PM.  Learner: Family, Patient  Readiness: Acceptance  Method: Explanation, Demonstration  Response: Needs Reinforcement, Verbalizes Understanding  Comment: safety wtih FWW, discharge recommendations, role of OT, POC    Education Comments  No comments found.        OP EDUCATION:       Goals:  Encounter Problems       Encounter Problems (Active)       ADLs       Patient with complete upper body dressing with independent level of assistance donning shirt w/out fasteners. (Progressing)       Start:  12/01/24    Expected End:  12/15/24            Patient with complete lower body dressing with modified independent level of assistance donning pants without a zipper/fasteners, socks, and shoes with PRN adaptive equipment. (Progressing)       Start:  12/01/24    Expected End:  12/15/24            Patient will complete toileting including hygiene clothing management/hygiene with modified independent level of assistance and prn adaptive equipment/LRD. (Progressing)       Start:  12/01/24    Expected End:  12/15/24               BALANCE       Pt will maintain dynamic standing balance during I/ADL task with modified independent level of assistance and LRD prn in order to demonstrate decreased risk of falling and improved postural control. (Progressing)       Start:  12/01/24    Expected End:  12/15/24               MOBILITY       Patient will perform Functional mobility max Household distances with modified independent level of assistance and least restrictive device in order to improve safety and functional mobility. (Progressing)       Start:  12/01/24    Expected End:  12/15/24                TRANSFERS       Patient will perform bed mobility independent level of assistance in order to improve safety and independence with mobility (Progressing)       Start:  12/01/24    Expected End:  12/15/24            Patient will complete functional transfer to/from toilet and chair with least restrictive device as needed with modified independent level of assistance. (Progressing)       Start:  12/01/24    Expected End:  12/15/24

## 2024-12-03 NOTE — DOCUMENTATION CLARIFICATION NOTE
"    PATIENT:               DUSTY NUNEZ  ACCT #:                  [id number]  MRN:                       [id number]  :                       1945  ADMIT DATE:       2024 5:36 AM  DISCH DATE:  RESPONDING PROVIDER #:        [id number]          PROVIDER RESPONSE TEXT:    Cerebral Edema    CDI QUERY TEXT:    Clarification    Instruction:    Based on your assessment of the patient and the clinical information, please provide the requested documentation by clicking on the appropriate radio button and enter any additional information if prompted.    Question: Please further clarify if there is a diagnosis related to the clinical information    When answering this query, please exercise your independent professional judgment. The fact that a question is being asked, does not imply that any particular answer is desired or expected.    The patient's clinical indicators include:  Clinical Information:  Patient is a 79 year old female who presented for anticipated right craniotomy for aneurysmal clipping    Clinical Indicators:  From the CT of the head on , \" There are postsurgical changes status post right pterional craniotomy for anterior communicating artery aneurysm clipping with overlying soft tissue swelling, edema, subcutaneous emphysema, and minimal blood products. \"    Treatment:  Keppra 500mg IV then 500mg tab PO BID, Dexamethasone 10mg IV, Mannitol 20% 20gm IV, frequent neuro checks, safety and seizure precautions    Risk Factors:  Craniotomy for aneurysm clipping, HTN  Options provided:  -- Cerebral Edema  -- Other - I will add my own diagnosis  -- Refer to Clinical Documentation Reviewer    Query created by: Johanna Rios on 2024 11:07 AM      Electronically signed by:  CHUYITA SANDOVAL MD 12/3/2024 11:02 AM          "

## 2024-12-04 LAB
ALBUMIN SERPL BCP-MCNC: 3.5 G/DL (ref 3.4–5)
ANION GAP SERPL CALC-SCNC: 11 MMOL/L (ref 10–20)
APTT PPP: 33 SECONDS (ref 27–38)
BUN SERPL-MCNC: 12 MG/DL (ref 6–23)
CALCIUM SERPL-MCNC: 9.1 MG/DL (ref 8.6–10.6)
CHLORIDE SERPL-SCNC: 108 MMOL/L (ref 98–107)
CO2 SERPL-SCNC: 23 MMOL/L (ref 21–32)
CREAT SERPL-MCNC: 0.37 MG/DL (ref 0.5–1.05)
EGFRCR SERPLBLD CKD-EPI 2021: >90 ML/MIN/1.73M*2
ERYTHROCYTE [DISTWIDTH] IN BLOOD BY AUTOMATED COUNT: 13.3 % (ref 11.5–14.5)
GLUCOSE SERPL-MCNC: 112 MG/DL (ref 74–99)
HCT VFR BLD AUTO: 32.5 % (ref 36–46)
HGB BLD-MCNC: 10.5 G/DL (ref 12–16)
INR PPP: 1 (ref 0.9–1.1)
MAGNESIUM SERPL-MCNC: 2.06 MG/DL (ref 1.6–2.4)
MCH RBC QN AUTO: 30.4 PG (ref 26–34)
MCHC RBC AUTO-ENTMCNC: 32.3 G/DL (ref 32–36)
MCV RBC AUTO: 94 FL (ref 80–100)
NRBC BLD-RTO: 0 /100 WBCS (ref 0–0)
PHOSPHATE SERPL-MCNC: 2.9 MG/DL (ref 2.5–4.9)
PLATELET # BLD AUTO: 335 X10*3/UL (ref 150–450)
POTASSIUM SERPL-SCNC: 4 MMOL/L (ref 3.5–5.3)
PROTHROMBIN TIME: 11.5 SECONDS (ref 9.8–12.8)
RBC # BLD AUTO: 3.45 X10*6/UL (ref 4–5.2)
SODIUM SERPL-SCNC: 138 MMOL/L (ref 136–145)
WBC # BLD AUTO: 10.5 X10*3/UL (ref 4.4–11.3)

## 2024-12-04 PROCEDURE — 85027 COMPLETE CBC AUTOMATED: CPT

## 2024-12-04 PROCEDURE — 85730 THROMBOPLASTIN TIME PARTIAL: CPT

## 2024-12-04 PROCEDURE — 80069 RENAL FUNCTION PANEL: CPT

## 2024-12-04 PROCEDURE — 2500000001 HC RX 250 WO HCPCS SELF ADMINISTERED DRUGS (ALT 637 FOR MEDICARE OP): Performed by: STUDENT IN AN ORGANIZED HEALTH CARE EDUCATION/TRAINING PROGRAM

## 2024-12-04 PROCEDURE — 83735 ASSAY OF MAGNESIUM: CPT

## 2024-12-04 PROCEDURE — 2500000002 HC RX 250 W HCPCS SELF ADMINISTERED DRUGS (ALT 637 FOR MEDICARE OP, ALT 636 FOR OP/ED): Performed by: STUDENT IN AN ORGANIZED HEALTH CARE EDUCATION/TRAINING PROGRAM

## 2024-12-04 PROCEDURE — 2500000004 HC RX 250 GENERAL PHARMACY W/ HCPCS (ALT 636 FOR OP/ED)

## 2024-12-04 PROCEDURE — 36415 COLL VENOUS BLD VENIPUNCTURE: CPT

## 2024-12-04 PROCEDURE — 1100000001 HC PRIVATE ROOM DAILY

## 2024-12-04 PROCEDURE — 2500000001 HC RX 250 WO HCPCS SELF ADMINISTERED DRUGS (ALT 637 FOR MEDICARE OP): Performed by: NURSE PRACTITIONER

## 2024-12-04 PROCEDURE — 2500000001 HC RX 250 WO HCPCS SELF ADMINISTERED DRUGS (ALT 637 FOR MEDICARE OP)

## 2024-12-04 ASSESSMENT — COGNITIVE AND FUNCTIONAL STATUS - GENERAL
WALKING IN HOSPITAL ROOM: A LITTLE
STANDING UP FROM CHAIR USING ARMS: A LITTLE
WALKING IN HOSPITAL ROOM: A LITTLE
MOVING TO AND FROM BED TO CHAIR: A LITTLE
DRESSING REGULAR LOWER BODY CLOTHING: A LITTLE
CLIMB 3 TO 5 STEPS WITH RAILING: A LITTLE
DAILY ACTIVITIY SCORE: 22
HELP NEEDED FOR BATHING: A LITTLE
DRESSING REGULAR LOWER BODY CLOTHING: A LITTLE
MOBILITY SCORE: 20
MOVING TO AND FROM BED TO CHAIR: A LITTLE
MOBILITY SCORE: 20
DAILY ACTIVITIY SCORE: 22
HELP NEEDED FOR BATHING: A LITTLE
STANDING UP FROM CHAIR USING ARMS: A LITTLE
CLIMB 3 TO 5 STEPS WITH RAILING: A LITTLE

## 2024-12-04 ASSESSMENT — PAIN - FUNCTIONAL ASSESSMENT
PAIN_FUNCTIONAL_ASSESSMENT: 0-10
PAIN_FUNCTIONAL_ASSESSMENT: 0-10
PAIN_FUNCTIONAL_ASSESSMENT: UNABLE TO SELF-REPORT
PAIN_FUNCTIONAL_ASSESSMENT: 0-10

## 2024-12-04 ASSESSMENT — PAIN SCALES - GENERAL
PAINLEVEL_OUTOF10: 0 - NO PAIN
PAINLEVEL_OUTOF10: 3
PAINLEVEL_OUTOF10: 4
PAINLEVEL_OUTOF10: 5 - MODERATE PAIN
PAINLEVEL_OUTOF10: 3
PAINLEVEL_OUTOF10: 6
PAINLEVEL_OUTOF10: 5 - MODERATE PAIN
PAINLEVEL_OUTOF10: 9

## 2024-12-04 ASSESSMENT — PAIN DESCRIPTION - LOCATION: LOCATION: HEAD

## 2024-12-04 NOTE — PROGRESS NOTES
Cristina Rivera is a 79 y.o. female on day 5 of admission presenting with Cerebral aneurysm ([facility]).    Subjective   No acute events overnight    Objective     Physical Exam  AOx3  both eyes 3R, EOMI  Face symmetric  Fcx4 5/5  SILT  Incision c/d/i    Last Recorded Vitals  Blood pressure 144/71, pulse 56, temperature 36.5 °C (97.7 °F), temperature source Temporal, resp. rate 16, weight 52.2 kg (115 lb), SpO2 95%.  Intake/Output last 3 Shifts:  I/O last 3 completed shifts:  In: 2131.7 (40.9 mL/kg) [P.O.:1140; IV Piggyback:991.7]  Out: 1252 (24 mL/kg) [Urine:1252 (0.7 mL/kg/hr)]  Weight: 52.2 kg     Relevant Results                 No results found for this or any previous visit (from the past 24 hours).                     Assessment/Plan   Assessment & Plan  Cerebral aneurysm ([facility])    Asthma    Gastroesophageal reflux disease    Cristina Perez is a 79 yr old F with H/o HTN, HLD, GERD, aortic stenosis, lymphocytic colitis, hyperparaT, L ophthalmic aneurysm s/p coil (2002, OSH), s/p L ICA coiling (2002, OSH), p/w newly found acomm aneurysm, 11/29 s/p R crani for acomm aneurysm clipping   12/1 SGD dc'd    Plan:  floor  -160  Strict IO  dex for HA until 23/5  Con't keppra until 12/6  PTOT-rehab  SCDs, SQH    Medically ready for discharge      Dari Landa MD

## 2024-12-04 NOTE — CARE PLAN
The clinical goals for the shift include safety, comfort/pain control, discharge planning    Patient answers orientation questions appropriately although can get confused in conversation occasionally when first woken up. Vitals stable at baseline. Complains of headache pain, PRN medications and heat pack administered as indicated, patient reports improvement in symptoms. Mobilizing x1 assist in room tolerating well. Awaiting insurance precert for rehab prior to discharge. Patient and family updated and agreeable to plan of care at this time.      Problem: Pain - Adult  Goal: Verbalizes/displays adequate comfort level or baseline comfort level  Outcome: Progressing     Problem: Safety - Adult  Goal: Free from fall injury  Outcome: Progressing     Problem: Discharge Planning  Goal: Discharge to home or other facility with appropriate resources  Outcome: Progressing     Problem: Chronic Conditions and Co-morbidities  Goal: Patient's chronic conditions and co-morbidity symptoms are monitored and maintained or improved  Outcome: Progressing     Problem: Skin  Goal: Decreased wound size/increased tissue granulation at next dressing change  Outcome: Progressing  Goal: Participates in plan/prevention/treatment measures  Outcome: Progressing  Goal: Prevent/manage excess moisture  Outcome: Progressing  Goal: Prevent/minimize sheer/friction injuries  Outcome: Progressing     Problem: Pain  Goal: Takes deep breaths with improved pain control throughout the shift  Outcome: Progressing  Goal: Turns in bed with improved pain control throughout the shift  Outcome: Progressing  Goal: Walks with improved pain control throughout the shift  Outcome: Progressing  Goal: Performs ADL's with improved pain control throughout shift  Outcome: Progressing     Problem: Fall/Injury  Goal: Not fall by end of shift  Outcome: Progressing  Goal: Be free from injury by end of the shift  Outcome: Progressing  Goal: Verbalize understanding of personal  risk factors for fall in the hospital  Outcome: Progressing

## 2024-12-05 LAB
ALBUMIN SERPL BCP-MCNC: 3.5 G/DL (ref 3.4–5)
ANION GAP SERPL CALC-SCNC: 13 MMOL/L (ref 10–20)
BUN SERPL-MCNC: 12 MG/DL (ref 6–23)
CALCIUM SERPL-MCNC: 9.4 MG/DL (ref 8.6–10.6)
CHLORIDE SERPL-SCNC: 105 MMOL/L (ref 98–107)
CO2 SERPL-SCNC: 24 MMOL/L (ref 21–32)
CREAT SERPL-MCNC: 0.45 MG/DL (ref 0.5–1.05)
EGFRCR SERPLBLD CKD-EPI 2021: >90 ML/MIN/1.73M*2
ERYTHROCYTE [DISTWIDTH] IN BLOOD BY AUTOMATED COUNT: 13.5 % (ref 11.5–14.5)
GLUCOSE SERPL-MCNC: 118 MG/DL (ref 74–99)
HCT VFR BLD AUTO: 38.3 % (ref 36–46)
HGB BLD-MCNC: 11.6 G/DL (ref 12–16)
MCH RBC QN AUTO: 30.8 PG (ref 26–34)
MCHC RBC AUTO-ENTMCNC: 30.3 G/DL (ref 32–36)
MCV RBC AUTO: 102 FL (ref 80–100)
NRBC BLD-RTO: 0 /100 WBCS (ref 0–0)
PHOSPHATE SERPL-MCNC: 2.9 MG/DL (ref 2.5–4.9)
PLATELET # BLD AUTO: 321 X10*3/UL (ref 150–450)
POTASSIUM SERPL-SCNC: 4.3 MMOL/L (ref 3.5–5.3)
RBC # BLD AUTO: 3.77 X10*6/UL (ref 4–5.2)
SODIUM SERPL-SCNC: 138 MMOL/L (ref 136–145)
WBC # BLD AUTO: 8.6 X10*3/UL (ref 4.4–11.3)

## 2024-12-05 PROCEDURE — 1100000001 HC PRIVATE ROOM DAILY

## 2024-12-05 PROCEDURE — 2500000001 HC RX 250 WO HCPCS SELF ADMINISTERED DRUGS (ALT 637 FOR MEDICARE OP): Performed by: STUDENT IN AN ORGANIZED HEALTH CARE EDUCATION/TRAINING PROGRAM

## 2024-12-05 PROCEDURE — 2500000004 HC RX 250 GENERAL PHARMACY W/ HCPCS (ALT 636 FOR OP/ED)

## 2024-12-05 PROCEDURE — 2500000001 HC RX 250 WO HCPCS SELF ADMINISTERED DRUGS (ALT 637 FOR MEDICARE OP)

## 2024-12-05 PROCEDURE — 80069 RENAL FUNCTION PANEL: CPT

## 2024-12-05 PROCEDURE — 2500000002 HC RX 250 W HCPCS SELF ADMINISTERED DRUGS (ALT 637 FOR MEDICARE OP, ALT 636 FOR OP/ED): Performed by: STUDENT IN AN ORGANIZED HEALTH CARE EDUCATION/TRAINING PROGRAM

## 2024-12-05 PROCEDURE — 85027 COMPLETE CBC AUTOMATED: CPT

## 2024-12-05 PROCEDURE — 36415 COLL VENOUS BLD VENIPUNCTURE: CPT

## 2024-12-05 PROCEDURE — 2500000001 HC RX 250 WO HCPCS SELF ADMINISTERED DRUGS (ALT 637 FOR MEDICARE OP): Performed by: NURSE PRACTITIONER

## 2024-12-05 RX ORDER — PANTOPRAZOLE SODIUM 40 MG/1
40 TABLET, DELAYED RELEASE ORAL
Start: 2024-12-06 | End: 2024-12-06

## 2024-12-05 RX ORDER — POLYETHYLENE GLYCOL 3350 17 G/17G
17 POWDER, FOR SOLUTION ORAL 2 TIMES DAILY
Start: 2024-12-05 | End: 2024-12-06

## 2024-12-05 RX ORDER — HEPARIN SODIUM 5000 [USP'U]/ML
5000 INJECTION, SOLUTION INTRAVENOUS; SUBCUTANEOUS EVERY 8 HOURS
Start: 2024-12-05 | End: 2024-12-06 | Stop reason: HOSPADM

## 2024-12-05 RX ORDER — AMOXICILLIN 250 MG
2 CAPSULE ORAL 2 TIMES DAILY
Start: 2024-12-05 | End: 2024-12-06

## 2024-12-05 RX ORDER — LANOLIN ALCOHOL/MO/W.PET/CERES
400 CREAM (GRAM) TOPICAL ONCE
Status: COMPLETED | OUTPATIENT
Start: 2024-12-05 | End: 2024-12-05

## 2024-12-05 RX ORDER — COLESEVELAM 180 1/1
1875 TABLET ORAL DAILY
Start: 2024-12-05

## 2024-12-05 RX ORDER — ACETAMINOPHEN 325 MG/1
975 TABLET ORAL EVERY 6 HOURS PRN
Start: 2024-12-05 | End: 2024-12-06

## 2024-12-05 RX ORDER — DEXAMETHASONE 2 MG/1
2 TABLET ORAL EVERY 12 HOURS SCHEDULED
Status: DISCONTINUED | OUTPATIENT
Start: 2024-12-05 | End: 2024-12-06 | Stop reason: HOSPADM

## 2024-12-05 RX ORDER — CAFFEINE 200 MG
200 TABLET ORAL ONCE
Status: COMPLETED | OUTPATIENT
Start: 2024-12-05 | End: 2024-12-05

## 2024-12-05 RX ORDER — PANTOPRAZOLE SODIUM 40 MG/1
40 TABLET, DELAYED RELEASE ORAL
Status: DISCONTINUED | OUTPATIENT
Start: 2024-12-06 | End: 2024-12-06 | Stop reason: HOSPADM

## 2024-12-05 RX ORDER — DEXAMETHASONE 2 MG/1
2 TABLET ORAL EVERY 12 HOURS SCHEDULED
Start: 2024-12-05 | End: 2024-12-06

## 2024-12-05 RX ORDER — OXYCODONE HYDROCHLORIDE 5 MG/1
5 TABLET ORAL EVERY 6 HOURS PRN
Start: 2024-12-05 | End: 2024-12-06

## 2024-12-05 ASSESSMENT — COGNITIVE AND FUNCTIONAL STATUS - GENERAL
STANDING UP FROM CHAIR USING ARMS: A LITTLE
CLIMB 3 TO 5 STEPS WITH RAILING: A LITTLE
HELP NEEDED FOR BATHING: A LITTLE
CLIMB 3 TO 5 STEPS WITH RAILING: A LITTLE
MOVING TO AND FROM BED TO CHAIR: A LITTLE
HELP NEEDED FOR BATHING: A LITTLE
DRESSING REGULAR LOWER BODY CLOTHING: A LITTLE
DAILY ACTIVITIY SCORE: 20
WALKING IN HOSPITAL ROOM: A LITTLE
DAILY ACTIVITIY SCORE: 22
STANDING UP FROM CHAIR USING ARMS: A LITTLE
DRESSING REGULAR LOWER BODY CLOTHING: A LITTLE
DRESSING REGULAR UPPER BODY CLOTHING: A LITTLE
MOVING TO AND FROM BED TO CHAIR: A LITTLE
WALKING IN HOSPITAL ROOM: A LITTLE
TOILETING: A LITTLE
MOBILITY SCORE: 20
MOBILITY SCORE: 20

## 2024-12-05 ASSESSMENT — PAIN - FUNCTIONAL ASSESSMENT
PAIN_FUNCTIONAL_ASSESSMENT: UNABLE TO SELF-REPORT
PAIN_FUNCTIONAL_ASSESSMENT: 0-10

## 2024-12-05 ASSESSMENT — PAIN SCALES - GENERAL
PAINLEVEL_OUTOF10: 9
PAINLEVEL_OUTOF10: 8
PAINLEVEL_OUTOF10: 5 - MODERATE PAIN
PAINLEVEL_OUTOF10: 7
PAINLEVEL_OUTOF10: 4

## 2024-12-05 NOTE — PROGRESS NOTES
Cristina Rivera is a 79 y.o. female on day 6 of admission presenting with Cerebral aneurysm ([facility]).    Subjective   No acute events overnight    Objective     Physical Exam  AOx3  both eyes 3R, EOMI  Face symmetric  Fcx4 5/5  SILT  Incision c/d/i    Last Recorded Vitals  Blood pressure 153/73, pulse 61, temperature 36.7 °C (98.1 °F), temperature source Temporal, resp. rate 16, weight (!) 44.5 kg (98 lb), SpO2 98%.  Intake/Output last 3 Shifts:  I/O last 3 completed shifts:  In: 1140 (25.6 mL/kg) [P.O.:1140]  Out: 652 (14.7 mL/kg) [Urine:652 (0.4 mL/kg/hr)]  Weight: 44.5 kg     Relevant Results                 Results for orders placed or performed during the hospital encounter of 11/29/24 (from the past 24 hours)   Coagulation Screen   Result Value Ref Range    Protime 11.5 9.8 - 12.8 seconds    INR 1.0 0.9 - 1.1    aPTT 33 27 - 38 seconds   Magnesium   Result Value Ref Range    Magnesium 2.06 1.60 - 2.40 mg/dL   CBC   Result Value Ref Range    WBC 10.5 4.4 - 11.3 x10*3/uL    nRBC 0.0 0.0 - 0.0 /100 WBCs    RBC 3.45 (L) 4.00 - 5.20 x10*6/uL    Hemoglobin 10.5 (L) 12.0 - 16.0 g/dL    Hematocrit 32.5 (L) 36.0 - 46.0 %    MCV 94 80 - 100 fL    MCH 30.4 26.0 - 34.0 pg    MCHC 32.3 32.0 - 36.0 g/dL    RDW 13.3 11.5 - 14.5 %    Platelets 335 150 - 450 x10*3/uL   Renal Function Panel   Result Value Ref Range    Glucose 112 (H) 74 - 99 mg/dL    Sodium 138 136 - 145 mmol/L    Potassium 4.0 3.5 - 5.3 mmol/L    Chloride 108 (H) 98 - 107 mmol/L    Bicarbonate 23 21 - 32 mmol/L    Anion Gap 11 10 - 20 mmol/L    Urea Nitrogen 12 6 - 23 mg/dL    Creatinine 0.37 (L) 0.50 - 1.05 mg/dL    eGFR >90 >60 mL/min/1.73m*2    Calcium 9.1 8.6 - 10.6 mg/dL    Phosphorus 2.9 2.5 - 4.9 mg/dL    Albumin 3.5 3.4 - 5.0 g/dL                        Assessment/Plan   Assessment & Plan  Cerebral aneurysm ([facility])    Asthma    Gastroesophageal reflux disease    Cristina Perez is a 79 yr old F with H/o HTN, HLD, GERD, aortic stenosis, lymphocytic  colitis, hyperparaT, L ophthalmic aneurysm s/p coil (2002, OSH), s/p L ICA coiling (2002, OSH), p/w newly found acomm aneurysm, 11/29 s/p R crani for acomm aneurysm clipping   12/1 SGD dc'd    Plan:  floor  -160  Strict IO  dex for HA until 23/5  Con't keppra until 12/6  PTOT-rehab  SCDs, SQH    Medically ready for discharge      Dari Landa MD

## 2024-12-06 ENCOUNTER — DOCUMENTATION (OUTPATIENT)
Dept: HOME HEALTH SERVICES | Facility: HOME HEALTH | Age: 79
End: 2024-12-06
Payer: COMMERCIAL

## 2024-12-06 ENCOUNTER — HOME HEALTH ADMISSION (OUTPATIENT)
Dept: HOME HEALTH SERVICES | Facility: HOME HEALTH | Age: 79
End: 2024-12-06
Payer: COMMERCIAL

## 2024-12-06 VITALS
WEIGHT: 98.55 LBS | TEMPERATURE: 98.8 F | BODY MASS INDEX: 21.33 KG/M2 | DIASTOLIC BLOOD PRESSURE: 89 MMHG | SYSTOLIC BLOOD PRESSURE: 156 MMHG | OXYGEN SATURATION: 95 % | RESPIRATION RATE: 17 BRPM | HEART RATE: 76 BPM

## 2024-12-06 DIAGNOSIS — K59.03 CONSTIPATION DUE TO PAIN MEDICATION: ICD-10-CM

## 2024-12-06 PROCEDURE — 2500000001 HC RX 250 WO HCPCS SELF ADMINISTERED DRUGS (ALT 637 FOR MEDICARE OP): Performed by: STUDENT IN AN ORGANIZED HEALTH CARE EDUCATION/TRAINING PROGRAM

## 2024-12-06 PROCEDURE — 97116 GAIT TRAINING THERAPY: CPT | Mod: GP,CQ

## 2024-12-06 PROCEDURE — 2500000002 HC RX 250 W HCPCS SELF ADMINISTERED DRUGS (ALT 637 FOR MEDICARE OP, ALT 636 FOR OP/ED): Performed by: STUDENT IN AN ORGANIZED HEALTH CARE EDUCATION/TRAINING PROGRAM

## 2024-12-06 PROCEDURE — 2500000001 HC RX 250 WO HCPCS SELF ADMINISTERED DRUGS (ALT 637 FOR MEDICARE OP)

## 2024-12-06 PROCEDURE — 2500000004 HC RX 250 GENERAL PHARMACY W/ HCPCS (ALT 636 FOR OP/ED)

## 2024-12-06 PROCEDURE — 2500000001 HC RX 250 WO HCPCS SELF ADMINISTERED DRUGS (ALT 637 FOR MEDICARE OP): Performed by: NURSE PRACTITIONER

## 2024-12-06 PROCEDURE — 97530 THERAPEUTIC ACTIVITIES: CPT | Mod: GP,CQ

## 2024-12-06 PROCEDURE — 2500000001 HC RX 250 WO HCPCS SELF ADMINISTERED DRUGS (ALT 637 FOR MEDICARE OP): Performed by: PHYSICIAN ASSISTANT

## 2024-12-06 PROCEDURE — 99239 HOSP IP/OBS DSCHRG MGMT >30: CPT | Performed by: PHYSICIAN ASSISTANT

## 2024-12-06 RX ORDER — POLYETHYLENE GLYCOL 3350 17 G/17G
17 POWDER, FOR SOLUTION ORAL 2 TIMES DAILY
Qty: 10 PACKET | Refills: 0 | Status: SHIPPED | OUTPATIENT
Start: 2024-12-06 | End: 2024-12-06

## 2024-12-06 RX ORDER — LOSARTAN POTASSIUM 50 MG/1
50 TABLET ORAL DAILY
Status: DISCONTINUED | OUTPATIENT
Start: 2024-12-06 | End: 2024-12-06 | Stop reason: HOSPADM

## 2024-12-06 RX ORDER — DEXAMETHASONE 2 MG/1
2 TABLET ORAL EVERY 12 HOURS SCHEDULED
Qty: 26 TABLET | Refills: 0 | Status: SHIPPED | OUTPATIENT
Start: 2024-12-06 | End: 2024-12-19

## 2024-12-06 RX ORDER — POLYETHYLENE GLYCOL 3350 17 G/17G
17 POWDER, FOR SOLUTION ORAL DAILY
Qty: 238 G | Refills: 0 | Status: SHIPPED | OUTPATIENT
Start: 2024-12-06

## 2024-12-06 RX ORDER — ACETAMINOPHEN 325 MG/1
975 TABLET ORAL EVERY 6 HOURS PRN
Qty: 30 TABLET | Refills: 0 | Status: SHIPPED | OUTPATIENT
Start: 2024-12-06 | End: 2024-12-13

## 2024-12-06 RX ORDER — PANTOPRAZOLE SODIUM 40 MG/1
40 TABLET, DELAYED RELEASE ORAL
Qty: 13 TABLET | Refills: 0 | Status: SHIPPED | OUTPATIENT
Start: 2024-12-06 | End: 2024-12-19

## 2024-12-06 RX ORDER — OXYCODONE HYDROCHLORIDE 5 MG/1
5 TABLET ORAL EVERY 6 HOURS PRN
Qty: 20 TABLET | Refills: 0 | Status: SHIPPED | OUTPATIENT
Start: 2024-12-06 | End: 2024-12-11

## 2024-12-06 RX ORDER — AMOXICILLIN 250 MG
2 CAPSULE ORAL 2 TIMES DAILY
Qty: 20 TABLET | Refills: 0 | Status: SHIPPED | OUTPATIENT
Start: 2024-12-06 | End: 2024-12-11

## 2024-12-06 ASSESSMENT — PAIN SCALES - GENERAL
PAINLEVEL_OUTOF10: 0 - NO PAIN
PAINLEVEL_OUTOF10: 0 - NO PAIN
PAINLEVEL_OUTOF10: 2

## 2024-12-06 ASSESSMENT — COGNITIVE AND FUNCTIONAL STATUS - GENERAL
MOBILITY SCORE: 18
STANDING UP FROM CHAIR USING ARMS: A LITTLE
WALKING IN HOSPITAL ROOM: A LITTLE
MOBILITY SCORE: 24
DAILY ACTIVITIY SCORE: 24
TURNING FROM BACK TO SIDE WHILE IN FLAT BAD: A LITTLE
MOVING FROM LYING ON BACK TO SITTING ON SIDE OF FLAT BED WITH BEDRAILS: A LITTLE
CLIMB 3 TO 5 STEPS WITH RAILING: A LITTLE
MOVING TO AND FROM BED TO CHAIR: A LITTLE

## 2024-12-06 ASSESSMENT — PAIN - FUNCTIONAL ASSESSMENT
PAIN_FUNCTIONAL_ASSESSMENT: 0-10
PAIN_FUNCTIONAL_ASSESSMENT: 0-10

## 2024-12-06 NOTE — PROGRESS NOTES
Cristina Rivera is a 79 y.o. female on day 7 of admission presenting with Cerebral aneurysm ([facility]).    Subjective   No acute events overnight, patient's headache returned yesterday after stopping the steroids, steroids restarted    Objective     Physical Exam  AOx3  both eyes 3R, EOMI  Face symmetric  Fcx4 5/5  SILT  Incision c/d/i    Last Recorded Vitals  Blood pressure 144/80, pulse 75, temperature 36.4 °C (97.5 °F), resp. rate 16, weight (!) 44.7 kg (98 lb 8.7 oz), SpO2 95%.  Intake/Output last 3 Shifts:  I/O last 3 completed shifts:  In: 516 (11.5 mL/kg) [P.O.:516]  Out: - (0 mL/kg)   Weight: 44.7 kg     Relevant Results                 Results for orders placed or performed during the hospital encounter of 11/29/24 (from the past 24 hours)   CBC   Result Value Ref Range    WBC 8.6 4.4 - 11.3 x10*3/uL    nRBC 0.0 0.0 - 0.0 /100 WBCs    RBC 3.77 (L) 4.00 - 5.20 x10*6/uL    Hemoglobin 11.6 (L) 12.0 - 16.0 g/dL    Hematocrit 38.3 36.0 - 46.0 %     (H) 80 - 100 fL    MCH 30.8 26.0 - 34.0 pg    MCHC 30.3 (L) 32.0 - 36.0 g/dL    RDW 13.5 11.5 - 14.5 %    Platelets 321 150 - 450 x10*3/uL   Renal Function Panel   Result Value Ref Range    Glucose 118 (H) 74 - 99 mg/dL    Sodium 138 136 - 145 mmol/L    Potassium 4.3 3.5 - 5.3 mmol/L    Chloride 105 98 - 107 mmol/L    Bicarbonate 24 21 - 32 mmol/L    Anion Gap 13 10 - 20 mmol/L    Urea Nitrogen 12 6 - 23 mg/dL    Creatinine 0.45 (L) 0.50 - 1.05 mg/dL    eGFR >90 >60 mL/min/1.73m*2    Calcium 9.4 8.6 - 10.6 mg/dL    Phosphorus 2.9 2.5 - 4.9 mg/dL    Albumin 3.5 3.4 - 5.0 g/dL                        Assessment/Plan   Assessment & Plan  Cerebral aneurysm ([facility])    Asthma    Gastroesophageal reflux disease    Cristina Perez is a 79 yr old F with H/o HTN, HLD, GERD, aortic stenosis, lymphocytic colitis, hyperparaT, L ophthalmic aneurysm s/p coil (2002, OSH), s/p L ICA coiling (2002, OSH), p/w newly found acomm aneurysm, 11/29 s/p R crani for acomm aneurysm  clipping   12/1 SGD dc'd    Plan:  floor  -160  Strict IO  dex for HA   Con't keppra until 12/6  PTOT-rehab  SCDs, SQH    Medically ready for discharge      Dari Landa MD

## 2024-12-06 NOTE — PROGRESS NOTES
Physical Therapy    Physical Therapy Treatment    Patient Name: Cristina Rivera  MRN: [id number]  Department: [place] [address]  Room: [address] [place] [address]  Today's Date: 12/6/2024  Time Calculation  Start Time: 1203  Stop Time: 1229  Time Calculation (min): 26 min         Assessment/Plan   PT Assessment  Rehab Prognosis: Excellent  Barriers to Discharge: medical  End of Session Communication: Bedside nurse  Assessment Comment: Pt. tolerated treatment well.  in room is very supportive and will assist as needed. Pt. stated that she was going home today. Pt. could benefit from low intensive therapy post acute.  End of Session Patient Position: Bed, 3 rail up, Alarm on  PT Plan  Inpatient/Swing Bed or Outpatient: Inpatient  PT Plan  Treatment/Interventions: Bed mobility, Transfer training, Gait training, Balance training, Neuromuscular re-education, Stair training, Strengthening, Therapeutic exercise, Therapeutic activity, Home exercise program, Positioning  PT Plan: Ongoing PT  PT Frequency: 4 times per week  PT Discharge Recommendations:  (If 24/7 supervision available at home, recommend low intensity PT services with 24/7 supervision. If this is not available, recommend high intensity PT services at d/c.)  Equipment Recommended upon Discharge: Wheeled walker  PT Recommended Transfer Status: Assist x1, Assistive device  PT - OK to Discharge: Yes (When medically ready)      General Visit Information:   PT  Visit  PT Received On: 12/06/24  General  Reason for Referral: s/p R crani for aneurysmal clipping (11/29)  Past Medical History Relevant to Rehab: HTN, HLD, GERD, aortic stenosis, lymphocytic colitis, hyperparaT, L ophthalmic aneurysm s/p coil (2002, OSH), s/p L ICA coiling (2002, OSH)  Family/Caregiver Present: Yes  Caregiver Feedback: -supportive  Prior to Session Communication: Bedside nurse  Patient Position Received: Up in chair, Alarm on  General Comment: Pt. supine in bed upon arrival. Pt. willing to participate.  Pt. states no complaints    Subjective   Precautions:  Precautions  Medical Precautions: Fall precautions    Vital Signs (Past 2hrs)                 Objective   Pain:  Pain Assessment  Pain Assessment: 0-10  0-10 (Numeric) Pain Score: 0 - No pain (No pain initially but increasing with ambulation.)  Pain Location: Head  Pain Orientation: Right  Cognition:  Cognition  Orientation Level: Oriented X4  Coordination:     Postural Control:  Postural Control  Postural Control: Within Functional Limits  Static Sitting Balance  Static Sitting-Balance Support: Bilateral upper extremity supported, Feet supported  Static Sitting-Level of Assistance: Close supervision  Static Standing Balance  Static Standing-Balance Support: Bilateral upper extremity supported  Static Standing-Level of Assistance: Contact guard  Extremity/Trunk Assessments:                      Activity Tolerance:  Activity Tolerance  Endurance: Tolerates 10 - 20 min exercise with multiple rests  Treatments:  Therapeutic Exercise  Therapeutic Exercise Performed: Yes  Therapeutic Exercise Activity 1: Standing bilat le marches x 15 reps.with hands flat on tray table. CGA    Therapeutic Activity  Therapeutic Activity Performed: Yes  Therapeutic Activity 1: Pt. requested to use bathroom. Pt. amb. into and out of bathroom using a wh. walker and cga- CGA to transfer onto and off toilet. Pt. performed chele care while sitting.  Therapeutic Activity 2: Doffed scrub pants and donned underware and pt.'s own pants with min assist. to thread and cga while pt. pulled pants up.    Bed Mobility  Bed Mobility: Yes  Bed Mobility 1  Bed Mobility 1:  (Recommended log roll - Instructions on technique- cga)    Ambulation/Gait Training  Ambulation/Gait Training Performed: Yes  Ambulation/Gait Training 1  Surface 1:  (Pt. amb. 70' x 2 using a wh. walker and close sba/light cga- Pt. requires reminders for proper positioning inside the walker as pt. tends to push it too far  ahead.)  Transfers  Transfer: Yes  Transfer 1  Transfer From 1: Bed to  Transfer to 1: Stand  Technique 1: Stand to sit  Transfer Level of Assistance 1: Contact guard    Outcome Measures:  [facility] Basic Mobility  Turning from your back to your side while in a flat bed without using bedrails: A little  Moving from lying on your back to sitting on the side of a flat bed without using bedrails: A little  Moving to and from bed to chair (including a wheelchair): A little  Standing up from a chair using your arms (e.g. wheelchair or bedside chair): A little  To walk in hospital room: A little  Climbing 3-5 steps with railing: A little  Basic Mobility - Total Score: 18    Education Documentation  No documentation found.  Education Comments  No comments found.        OP EDUCATION:       Encounter Problems       Encounter Problems (Active)       PT Problem       Pt will perform supine<>sit mod I (Progressing)       Start:  12/01/24    Expected End:  12/15/24            Pt will perform sit<>stand with LRAD mod I (Progressing)       Start:  12/01/24    Expected End:  12/15/24            Pt will amb 150ft with LRAD mod I (Progressing)       Start:  12/01/24    Expected End:  12/15/24            Pt will amb in hallway, while completing dual task, without LOB (Progressing)       Start:  12/01/24    Expected End:  12/15/24                  Encounter Problems (Resolved)       Pain - Adult

## 2024-12-06 NOTE — DISCHARGE SUMMARY
Discharge Diagnosis  Cerebral aneurysm ([facility])    Test Results Pending At Discharge  Pending Labs       No current pending labs.          Hospital Course  Cristina Rivera is a 79 year old female with a past history of HTN, HLD, GERD, aortic stenosis, lymphocytic colitis, hyperparaT and L ophthalmic aneurysm s/p coil (2002, OSH), s/p L ICA coiling (2002, OSH) who presented with newly found acomm aneurysm.    11/29 s/p R crani for aneurysm clipping   12/1 SGD removed  12/3 Complaints of headache overnight and started on Dex for 2 days with improvement in headache.  12/5 Started on Dex 2 BID x14 days.   12/6 Patient denied acute rehab placement, decision made for discharge home.     PT/OT eval recommend acute rehab, however, patient denied placement per insurance and instead orders placed for home care.     On the day of discharge, the patient was seen and evaluated by the neurosurgery team and deemed suitable for discharge. The patient was given detailed discharge instructions and were scheduled to follow up as an outpatient.     Pertinent Physical Exam At Time of Discharge  Constitutional: A&Ox3, calm and cooperative, NAD.  Eyes: Both eyes 3R. EOMI.   ENMT: Face symmetric.   Head/Neck: Cranial incision C/D/I.    Cardiovascular: Normal rate and regular rhythm. 2+ equal pulses of the distal extremities.  Respiratory/Thorax: CTAB, regular respirations on RA. Good symmetric chest expansion.   Gastrointestinal: Abdomen soft, non tender.   Extremities: Follow commands x4. BLE 5/5. BUE 5/5. SILT.   Neurological: A&Ox3.   Psychological: Appropriate mood and behavior.   Skin: Warm and dry.     Home Medications     Medication List      START taking these medications     acetaminophen 325 mg tablet; Commonly known as: Tylenol; Take 3 tablets   (975 mg) by mouth every 6 hours if needed for mild pain (1 - 3), headaches   or fever (temp greater than 38.0 C) for up to 7 days.   dexAMETHasone 2 mg tablet; Commonly known as:  Decadron; Take 1 tablet (2   mg) by mouth every 12 hours for 13 days.   oxyCODONE 5 mg immediate release tablet; Commonly known as: Roxicodone;   Take 1 tablet (5 mg) by mouth every 6 hours if needed for severe pain (7 -   10) for up to 5 days.   pantoprazole 40 mg EC tablet; Commonly known as: ProtoNix; Take 1 tablet   (40 mg) by mouth once daily in the morning. Take before meals for 13 days.   Do not crush, chew, or split.   polyethylene glycol 17 gram packet; Commonly known as: Glycolax,   Miralax; Take 17 g by mouth 2 times a day for 5 days.   sennosides-docusate sodium 8.6-50 mg tablet; Commonly known as:   Page-Colace; Take 2 tablets by mouth 2 times a day for 5 days.     CHANGE how you take these medications     colesevelam 625 mg tablet; Commonly known as: Welchol; Take 3 tablets   (1,875 mg) by mouth once daily. Takes #3 tablets once daily at lunch; What   changed: when to take this, additional instructions     CONTINUE taking these medications     atorvastatin 10 mg tablet; Commonly known as: Lipitor   carvedilol 12.5 mg tablet; Commonly known as: Coreg   dorzolamide 2 % ophthalmic solution; Commonly known as: Trusopt   famotidine 20 mg tablet; Commonly known as: Pepcid   fluticasone propion-salmeteroL 230-21 mcg/actuation inhaler; Commonly   known as: Advair   furosemide 20 mg tablet; Commonly known as: Lasix   latanoprost 0.005 % ophthalmic solution; Commonly known as: Xalatan   losartan 50 mg tablet; Commonly known as: Cozaar   potassium chloride CR 8 mEq ER tablet; Commonly known as: Klor-Con   tamsulosin 0.4 mg 24 hr capsule; Commonly known as: Flomax   trimethoprim 100 mg tablet; Commonly known as: Trimpex     Outpatient Follow-Up  Future Appointments   Date Time Provider Department Center   1/16/2025  1:00 PM Patricia Camara APRN-CNP [id number]       Sharri Meadows PA-C    Total face to face time spent with patient/family of >30 minutes, with >50% of the time spent discussing plan of  care/management, counseling/educating on disease processes, explaining results of diagnostic testing.

## 2024-12-06 NOTE — HH CARE COORDINATION
Home Care received a Referral for Physical Therapy and Occupational Therapy. We have processed the referral for a Start of Care on 12/7 OR 12/8.     If you have any questions or concerns, please feel free to contact us at [phone]. Follow the prompts, enter your five digit zip code, and you will be directed to your care team on [facility] 3.

## 2024-12-06 NOTE — PROGRESS NOTES
Care Transitions Progress Note: TCC met with patient and her spouse to update them on [employer] denial for AR.   TCC asked if they wanted an appeal to be done and they both declined.  Patient prefers to go home with home care.  Her spouse and daughter's will be able to provide assistance to her.  [facility] home care accepted patient.

## 2024-12-06 NOTE — CARE PLAN
The patient's goals for the shift include      The clinical goals for the shift include patient will maitain neuro exam through shift, will rate pain at a tolerable level through the night      Patient is discharging home.

## 2024-12-09 ENCOUNTER — HOME CARE VISIT (OUTPATIENT)
Dept: HOME HEALTH SERVICES | Facility: HOME HEALTH | Age: 79
End: 2024-12-09
Payer: COMMERCIAL

## 2024-12-09 VITALS — DIASTOLIC BLOOD PRESSURE: 70 MMHG | SYSTOLIC BLOOD PRESSURE: 112 MMHG

## 2024-12-09 PROCEDURE — G0151 HHCP-SERV OF PT,EA 15 MIN: HCPCS

## 2024-12-09 PROCEDURE — G0152 HHCP-SERV OF OT,EA 15 MIN: HCPCS

## 2024-12-09 ASSESSMENT — ENCOUNTER SYMPTOMS
PAIN LOCATION: HEAD
PERSON REPORTING PAIN: PATIENT
HIGHEST PAIN SEVERITY IN PAST 24 HOURS: 9/10
PAIN: 1
LOWEST PAIN SEVERITY IN PAST 24 HOURS: 3/10
PERSON REPORTING PAIN: PATIENT
SUBJECTIVE PAIN PROGRESSION: GRADUALLY IMPROVING
PAIN LOCATION - PAIN SEVERITY: 6/10
OCCASIONAL FEELINGS OF UNSTEADINESS: 0
DENIES PAIN: 1

## 2024-12-09 ASSESSMENT — ACTIVITIES OF DAILY LIVING (ADL)
DRESSING_LB_CURRENT_FUNCTION: INDEPENDENT
LAUNDRY ASSESSED: 1
BATHING_CURRENT_FUNCTION: MODERATE ASSIST
FEEDING ASSESSED: 1
GROOMING_CURRENT_FUNCTION: INDEPENDENT
CURRENT_FUNCTION: SUPERVISION
PHYSICAL TRANSFERS ASSESSED: 1
TOILETING: 1
ENTERING_EXITING_HOME: NEEDS ASSISTANCE
GROOMING ASSESSED: 1
AMBULATION ASSISTANCE ON FLAT SURFACES: 1
PREPARING MEALS: DEPENDENT
FEEDING: INDEPENDENT
TOILETING: INDEPENDENT
LAUNDRY: DEPENDENT
BATHING ASSESSED: 1
OASIS_M1830: 03
DRESSING_UB_CURRENT_FUNCTION: INDEPENDENT

## 2024-12-09 NOTE — HOME HEALTH
Patient seen with spouse for OT evaluation visit. Patient was recently hospitalized with cerebral aneurysm, with a right craniotomy for aneurysm clipping on 11.29.24.     Lives with spouse in a ranch home with 2 steps to enter the front door. 1st floor has bedroom and full bathroom with a walk-in shower, and laundry. She currently stays on the 1st floor.     Medical history of lymphocytic colitis, left ICA coiling 2002, hyperpara T and L opthalmic aneurysm s/p coil 2002, HTN, HLD, GERD, aortic stenosis.     Patient has a wheeled walker, shower chair, wall mounted grab bar, reacher.    Prior to hospitalization, was independent with ADLs and IADLs, drove. Didn't use an ambulatory device. She is retired.     Barthel index-90 out of 100.     UE AROM and strength WNL. Patient in agreement with OT POC. Plan to see 2w1, 1w1 to address showering/walk-in shower transfer, light estevan prep and light laundry.    Plan for next visit-instruct on showering safety techniques, review walk-in shower transfer technique, IADL training-light meal prep and light laundry. Review home safety.

## 2024-12-10 NOTE — CASE COMMUNICATION
OT evaluation completed 12.9.24. Plan to see 2w1, 1w1 to address showering/walk-in shower transfer, light meal prep and light laundry.

## 2024-12-13 ENCOUNTER — HOME CARE VISIT (OUTPATIENT)
Dept: HOME HEALTH SERVICES | Facility: HOME HEALTH | Age: 79
End: 2024-12-13
Payer: COMMERCIAL

## 2024-12-13 VITALS — OXYGEN SATURATION: 97 % | HEART RATE: 71 BPM | DIASTOLIC BLOOD PRESSURE: 70 MMHG | SYSTOLIC BLOOD PRESSURE: 124 MMHG

## 2024-12-13 PROCEDURE — G0152 HHCP-SERV OF OT,EA 15 MIN: HCPCS

## 2024-12-13 PROCEDURE — G0151 HHCP-SERV OF PT,EA 15 MIN: HCPCS

## 2024-12-13 ASSESSMENT — ENCOUNTER SYMPTOMS
PERSON REPORTING PAIN: PATIENT
SUBJECTIVE PAIN PROGRESSION: WAXING AND WANING
PERSON REPORTING PAIN: PATIENT
PAIN LOCATION - PAIN SEVERITY: 0/10
PAIN LOCATION - PAIN SEVERITY: 5/10
PAIN: 1
PAIN: 1
PAIN LOCATION: HEAD

## 2024-12-13 NOTE — HOME HEALTH
Patient seen with spouse, daughter Faye and zoltan for OT visit. Daughter states patient was told by PT she didn't have to use the walker during the day but should still use it at night time.    Plan for next visit-ADL training-showering, review walk-in shower transfer, IADL training-light meal prep and light laundry. Planned DC visit.

## 2024-12-18 ENCOUNTER — HOME CARE VISIT (OUTPATIENT)
Dept: HOME HEALTH SERVICES | Facility: HOME HEALTH | Age: 79
End: 2024-12-18
Payer: COMMERCIAL

## 2024-12-18 VITALS — OXYGEN SATURATION: 97 % | HEART RATE: 73 BPM | SYSTOLIC BLOOD PRESSURE: 124 MMHG | DIASTOLIC BLOOD PRESSURE: 70 MMHG

## 2024-12-18 PROCEDURE — G0151 HHCP-SERV OF PT,EA 15 MIN: HCPCS

## 2024-12-18 PROCEDURE — G0152 HHCP-SERV OF OT,EA 15 MIN: HCPCS

## 2024-12-18 ASSESSMENT — ENCOUNTER SYMPTOMS
HIGHEST PAIN SEVERITY IN PAST 24 HOURS: 8/10
OCCASIONAL FEELINGS OF UNSTEADINESS: 0
PERSON REPORTING PAIN: PATIENT
SUBJECTIVE PAIN PROGRESSION: WAXING AND WANING
PAIN LOCATION - PAIN QUALITY: ACHE
PAIN: 1
LOWEST PAIN SEVERITY IN PAST 24 HOURS: 3/10
PERSON REPORTING PAIN: PATIENT
DENIES PAIN: 1
PAIN LOCATION - PAIN SEVERITY: 0/10
PAIN LOCATION - PAIN SEVERITY: 6/10
PAIN LOCATION: HEAD

## 2024-12-18 NOTE — HOME HEALTH
Patient seen with spouse for OT DC visit. No further OT visits indicated, patient and family in agreement. OT DC at this date, goals met. ST services are still active.     Barthel index-95 out of 100.    DISCHARGE SUMMARY:    DISCIPLINE: Occupational Therapy  DATE OF DISCIPLINE DISCHARGE: 12.18.24  REASON FOR DISCHARGE: GOALS MET  COORDINATION NOTE: COMPLETED  EVALUATION OF GOALS: ALL GOALS MET  SUMMARY OF CARE PROVIDED: INSTRUCTED ON SAFETY TECHNIQUES AND EQUIPMENT USE FOR SHOWERING/WALK-IN SHOWER TRANSFER, LIGHT MEAL PREP AND LIGHT LAUNDRY.  DISCHARGE INSTRUCTIONS GIVEN: CONTINUE TO FOLLOW SAFETY INSTRUCTIONS.  SERVICES REMAINING: ST  NOMNC SIGNED: NA

## 2024-12-19 ENCOUNTER — OFFICE VISIT (OUTPATIENT)
Dept: NEUROSURGERY | Facility: CLINIC | Age: 79
End: 2024-12-19
Payer: COMMERCIAL

## 2024-12-19 ENCOUNTER — HOME CARE VISIT (OUTPATIENT)
Dept: HOME HEALTH SERVICES | Facility: HOME HEALTH | Age: 79
End: 2024-12-19
Payer: COMMERCIAL

## 2024-12-19 VITALS
RESPIRATION RATE: 16 BRPM | HEART RATE: 78 BPM | BODY MASS INDEX: 18.95 KG/M2 | WEIGHT: 94 LBS | HEIGHT: 59 IN | SYSTOLIC BLOOD PRESSURE: 110 MMHG | DIASTOLIC BLOOD PRESSURE: 54 MMHG

## 2024-12-19 VITALS — HEART RATE: 72 BPM | OXYGEN SATURATION: 100 %

## 2024-12-19 DIAGNOSIS — I67.1 CEREBRAL ANEURYSM (HHS-HCC): Primary | ICD-10-CM

## 2024-12-19 PROCEDURE — 1111F DSCHRG MED/CURRENT MED MERGE: CPT | Performed by: NURSE PRACTITIONER

## 2024-12-19 PROCEDURE — 99211 OFF/OP EST MAY X REQ PHY/QHP: CPT | Performed by: NURSE PRACTITIONER

## 2024-12-19 PROCEDURE — G0153 HHCP-SVS OF S/L PATH,EA 15MN: HCPCS

## 2024-12-19 PROCEDURE — 1157F ADVNC CARE PLAN IN RCRD: CPT | Performed by: NURSE PRACTITIONER

## 2024-12-19 PROCEDURE — 1126F AMNT PAIN NOTED NONE PRSNT: CPT | Performed by: NURSE PRACTITIONER

## 2024-12-19 ASSESSMENT — PAIN SCALES - PAIN ASSESSMENT IN ADVANCED DEMENTIA (PAINAD)
NEGVOCALIZATION: 0 - NONE.
NEGVOCALIZATION: 0
TOTALSCORE: 0
FACIALEXPRESSION: 0 - SMILING OR INEXPRESSIVE.
CONSOLABILITY: 0 - NO NEED TO CONSOLE.
CONSOLABILITY: 0
BODYLANGUAGE: 0
BREATHING: 0
FACIALEXPRESSION: 0
BODYLANGUAGE: 0 - RELAXED.

## 2024-12-19 ASSESSMENT — ENCOUNTER SYMPTOMS
DENIES PAIN: 1
AGGRESSION WITHIN DEFINED LIMITS: 1
ANGER WITHIN DEFINED LIMITS: 1
PERSON REPORTING PAIN: PATIENT

## 2024-12-19 ASSESSMENT — PAIN SCALES - GENERAL: PAINLEVEL_OUTOF10: 0-NO PAIN

## 2024-12-19 NOTE — PROGRESS NOTES
"[facility]  Neurosurgery    History of Present Illness      Cristina Rivera is a 79 year old female with a history of severe OA with back pain, osteoporosis, HTN, Familial hypercholesterolemia, Aortic valve sclerosis,GERD,microscopic colitis, hyperparathyroidism, who is s/p coiling of left superior ophthalmic aneurysm and L ICA occlusion coils. Coiling was completed 27 years ago at [facility]. Last vascular imaging was completed in 2002 (read only) where she was noted to have coil artifact with lack of flow related enhancement in L ICA but noted a 1cm segment of flow in proximal ICA and 1.5cm segment flow in terminal ICA. No additional aneurysm noted at that time. She was recently evaluated by Dr. Negro for an elevate PTH of 135. Patient was recommended for CT/CTA imaging noted a parathyroid adenoma as well as a 5-6mm acomm aneurysm. Formal diagnostic angiogram with acomm aneurysm measuring 5x5 x 4.7m with 3.9mm.     Patient is now s/p R craniotomy for aneurysm clipping 11/29/24 with Dr. Mc. Postoperative course was significant for headaches treated with dexamethasone. Patient was able to be discharged to home with PT/OT as insurance denied placement for acute rehab. She presents for 2 week POV.     Continued headaches that are moderate to severe and still 10/10 on pain scale requiring tylenol and as needed oxycodone. Since surgery patient with short term memory difficulties and during visit was unable to recall that she just had surgery / reason for visit. Family present to assist with history. Denies any dizziness or visual changes. Incision without drainage or erythema. They did speak to oncall team who felt short term memory was being exacerbated by dexamethasone.         Objective      Vitals:   /54   Pulse 78   Resp 16   Ht 1.499 m (4' 11\")   Wt (!) 42.6 kg (94 lb)   BMI 18.99 kg/m²         Physical Exam:    A&Ox 2-3 (unable to recall current president)   Difficulties " with recall, did not recall surgery / events since her procedure   EOMI; Fcx3   MOFFETT; strength 5/5; no drift   SILT   Face and shoulder shrug symmetrical   Incision C/D/I   Gait steady         Relevant Results:    CTA 11/29/24 w/wo: Postsurgical changes s/p clipping R acomm aneurysm without residual         Angiogram 10/17/24:           Assessment & Plan      Diagnosis:  Cristina was seen today for post-op.  Diagnoses and all orders for this visit:  Cerebral aneurysm ([facility])  -     CT head wo IV contrast; Future  -     traMADol (Ultram) 50 mg tablet; Take 1 tablet (50 mg) by mouth every 6 hours for 5 days.          Provider Impression:     Patient is a 79-year-old female presenting for postoperative evaluation s/p right craniotomy for aneurysm clipping on 11/29/24 for acomm aneurysm with a known history of L ICA coiling of aneurysm in 2022. Continued headaches in the postoeprative period that are moderate to severe in nature. She is taking oxycodone as needed and has a few tablets of dexamethasone 1mg left. Short term memory / recall difficulties since surgery.     Regarding pain management will discontinue oxycodone for pain and switch patient to tramadol 50mg as needed every 6 hours for moderate to severe pain only. She should continue to take tylenol as needed and limit opioid use given memory difficulties. Stop dexamethasone as she only has a few remaining tablets and is down to 1mg daily.     I have personally reviewed the OARRS report for this patient. I have considered the risks of abuse, dependence, addition, and diversion.     - CTH prior to next visit   - RTC in 4 weeks   - Continue with activity restriction with no heave lifting greater than 10-15 lbs   - Ok to resume normal shampoo / conditioner but no chemical to hair x 3 months post op   - No driving    - Continue with PT/OT/and ST for cognition rehab     Plan discussed with family and patient who were in agreement. All questions answered.     Medical  History     Past Medical History:   Diagnosis Date    Anemia     Aortic valve sclerosis     Arthritis     Asthma     Cataract     Cerebral aneurysm ([facility])     Colitis     GERD (gastroesophageal reflux disease)     Glaucoma     Hyperlipidemia     Hyperparathyroidism (Multi)     Hypertension     Parathyroid adenoma     Ulcerative colitis     Urinary tract infection      Past Surgical History:   Procedure Laterality Date    APPENDECTOMY  2016    Appendectomy    HYSTERECTOMY  2016    Hysterectomy    ROTATOR CUFF REPAIR  2016    Rotator Cuff Repair     Social History     Tobacco Use    Smoking status: Former     Current packs/day: 0.00     Types: Cigarettes     Quit date: 1974     Years since quittin.0    Smokeless tobacco: Former   Substance Use Topics    Alcohol use: Yes    Drug use: Never     Family History   Problem Relation Name Age of Onset    Ovarian cancer Mother      Ulcers Father      Cancer Sister      Brain Aneurysm Brother       Allergies   Allergen Reactions    Codeine GI Upset    Dilantin [Phenytoin Sodium Extended] Unknown     Current Outpatient Medications   Medication Instructions    atorvastatin (LIPITOR) 10 mg, Daily    carvedilol (COREG) 12.5 mg, 2 times daily    colesevelam (WELCHOL) 1,875 mg, oral, Daily, Takes #3 tablets once daily at lunch    dexAMETHasone (DECADRON) 2 mg, oral, Every 12 hours scheduled    dorzolamide (Trusopt) 2 % ophthalmic solution 1 drop, 2 times daily    famotidine (PEPCID) 20 mg, 2 times daily    fluticasone propion-salmeteroL (Advair) 230-21 mcg/actuation inhaler 1 puff, 2 times daily RT    furosemide (LASIX) 20 mg, Daily PRN    latanoprost (Xalatan) 0.005 % ophthalmic solution 1 drop, Nightly    losartan (COZAAR) 50 mg, Daily    pantoprazole (PROTONIX) 40 mg, oral, Daily before breakfast, Do not crush, chew, or split.    polyethylene glycol (GLYCOLAX, MIRALAX) 17 g, oral, Daily, Dissolve into 8 oz of liquid    potassium chloride CR (Klor-Con) 8  mEq ER tablet 8 mEq, Daily    tamsulosin (FLOMAX) 0.4 mg, Daily    traMADol (ULTRAM) 50 mg, oral, Every 6 hours    trimethoprim (TRIMPEX) 100 mg, Daily

## 2024-12-20 RX ORDER — TRAMADOL HYDROCHLORIDE 50 MG/1
50 TABLET ORAL EVERY 6 HOURS
Qty: 20 TABLET | Refills: 0 | Status: SHIPPED | OUTPATIENT
Start: 2024-12-20 | End: 2024-12-25

## 2024-12-22 ENCOUNTER — HOME CARE VISIT (OUTPATIENT)
Dept: HOME HEALTH SERVICES | Facility: HOME HEALTH | Age: 79
End: 2024-12-22
Payer: COMMERCIAL

## 2024-12-22 VITALS — OXYGEN SATURATION: 98 % | HEART RATE: 73 BPM

## 2024-12-22 PROCEDURE — G0153 HHCP-SVS OF S/L PATH,EA 15MN: HCPCS

## 2024-12-22 ASSESSMENT — PAIN SCALES - PAIN ASSESSMENT IN ADVANCED DEMENTIA (PAINAD)
BODYLANGUAGE: 0 - RELAXED.
CONSOLABILITY: 0
FACIALEXPRESSION: 0
TOTALSCORE: 0
BODYLANGUAGE: 0
NEGVOCALIZATION: 0 - NONE.
BREATHING: 0
CONSOLABILITY: 0 - NO NEED TO CONSOLE.
NEGVOCALIZATION: 0
FACIALEXPRESSION: 0 - SMILING OR INEXPRESSIVE.

## 2024-12-22 ASSESSMENT — ENCOUNTER SYMPTOMS
PAIN: 1
PERSON REPORTING PAIN: PATIENT
PAIN LOCATION - PAIN SEVERITY: 7/10
PAIN LOCATION: JAW

## 2024-12-30 ENCOUNTER — HOME CARE VISIT (OUTPATIENT)
Dept: HOME HEALTH SERVICES | Facility: HOME HEALTH | Age: 79
End: 2024-12-30
Payer: COMMERCIAL

## 2024-12-30 VITALS — HEART RATE: 77 BPM | OXYGEN SATURATION: 99 %

## 2024-12-30 PROBLEM — E78.01 FAMILIAL HYPERCHOLESTEROLEMIA: Status: ACTIVE | Noted: 2024-12-30

## 2024-12-30 PROBLEM — J20.9 ACUTE BRONCHITIS: Status: ACTIVE | Noted: 2024-12-30

## 2024-12-30 PROBLEM — I10 HYPERTENSION: Status: ACTIVE | Noted: 2024-12-30

## 2024-12-30 PROBLEM — N60.19 FIBROCYSTIC BREAST CHANGES: Status: ACTIVE | Noted: 2024-12-30

## 2024-12-30 PROBLEM — H40.9 GLAUCOMA: Status: ACTIVE | Noted: 2024-12-30

## 2024-12-30 PROBLEM — M48.46XA LUMBAR STRESS FRACTURE: Status: ACTIVE | Noted: 2024-04-25

## 2024-12-30 PROBLEM — S32.000A COMPRESSION OF LUMBAR VERTEBRA (MULTI): Status: ACTIVE | Noted: 2024-04-25

## 2024-12-30 PROBLEM — C44.310 BASAL CELL CARCINOMA (BCC) OF FACE: Status: ACTIVE | Noted: 2024-12-30

## 2024-12-30 PROBLEM — M19.90 ARTHRITIS: Status: ACTIVE | Noted: 2024-12-30

## 2024-12-30 PROBLEM — K52.839 MICROSCOPIC COLITIS: Status: ACTIVE | Noted: 2024-05-06

## 2024-12-30 PROBLEM — I35.8 AORTIC VALVE SCLEROSIS: Status: ACTIVE | Noted: 2024-12-30

## 2024-12-30 PROBLEM — E21.3 HYPERPARATHYROIDISM (MULTI): Status: ACTIVE | Noted: 2024-12-30

## 2024-12-30 PROBLEM — M81.0 OSTEOPOROSIS: Status: ACTIVE | Noted: 2024-04-25

## 2024-12-30 PROBLEM — N63.10 LUMP OF RIGHT BREAST: Status: ACTIVE | Noted: 2024-12-30

## 2024-12-30 PROBLEM — N39.0 RECURRENT URINARY TRACT INFECTION: Status: ACTIVE | Noted: 2024-12-30

## 2024-12-30 PROCEDURE — G0153 HHCP-SVS OF S/L PATH,EA 15MN: HCPCS

## 2024-12-30 RX ORDER — AZITHROMYCIN 250 MG/1
TABLET, FILM COATED ORAL
COMMUNITY
Start: 2023-04-14

## 2024-12-30 RX ORDER — CALCITONIN SALMON 200 [IU]/.09ML
SPRAY, METERED NASAL
COMMUNITY
Start: 2024-04-11

## 2024-12-30 RX ORDER — AMLODIPINE BESYLATE 2.5 MG/1
TABLET ORAL
COMMUNITY
Start: 2020-05-14

## 2024-12-30 RX ORDER — CETIRIZINE HYDROCHLORIDE 10 MG/1
TABLET, CHEWABLE ORAL
COMMUNITY

## 2024-12-30 RX ORDER — OXYCODONE AND ACETAMINOPHEN 5; 325 MG/1; MG/1
1 TABLET ORAL EVERY 6 HOURS PRN
COMMUNITY
Start: 2024-03-27

## 2024-12-30 RX ORDER — ONDANSETRON 4 MG/1
TABLET, FILM COATED ORAL
COMMUNITY
Start: 2024-02-28

## 2024-12-30 RX ORDER — BENZONATATE 100 MG/1
CAPSULE ORAL
COMMUNITY
Start: 2023-04-14

## 2024-12-30 RX ORDER — NIRMATRELVIR AND RITONAVIR 300-100 MG
KIT ORAL
COMMUNITY
Start: 2024-01-11

## 2024-12-30 RX ORDER — FLUTICASONE FUROATE AND VILANTEROL TRIFENATATE 100; 25 UG/1; UG/1
POWDER RESPIRATORY (INHALATION)
COMMUNITY
Start: 2024-03-25

## 2024-12-30 RX ORDER — BRINZOLAMIDE 10 MG/ML
SUSPENSION/ DROPS OPHTHALMIC
COMMUNITY
Start: 2020-05-14

## 2024-12-30 RX ORDER — ALENDRONATE SODIUM 70 MG/1
70 TABLET ORAL
COMMUNITY

## 2024-12-30 RX ORDER — BUDESONIDE AND FORMOTEROL FUMARATE DIHYDRATE 160; 4.5 UG/1; UG/1
AEROSOL RESPIRATORY (INHALATION)
COMMUNITY

## 2024-12-30 RX ORDER — CYCLOBENZAPRINE HCL 10 MG
TABLET ORAL
COMMUNITY
Start: 2024-03-21

## 2024-12-30 RX ORDER — ALBUTEROL SULFATE 90 UG/1
INHALANT RESPIRATORY (INHALATION)
COMMUNITY
Start: 2020-10-07

## 2024-12-30 RX ORDER — SYRING-NEEDL,DISP,INSUL,0.3 ML 29 G X1/2"
SYRINGE, EMPTY DISPOSABLE MISCELLANEOUS
COMMUNITY
Start: 2024-03-04

## 2024-12-30 RX ORDER — LIDOCAINE 4 G/100G
PATCH TOPICAL
COMMUNITY
Start: 2024-02-29

## 2024-12-30 RX ORDER — IBANDRONATE SODIUM 150 MG/1
TABLET, FILM COATED ORAL
COMMUNITY

## 2024-12-30 RX ORDER — LISINOPRIL 10 MG/1
TABLET ORAL
COMMUNITY

## 2024-12-30 RX ORDER — NORTRIPTYLINE HYDROCHLORIDE 25 MG/1
CAPSULE ORAL
COMMUNITY
Start: 2015-08-25

## 2024-12-30 RX ORDER — FLUTICASONE PROPIONATE 50 MCG
SPRAY, SUSPENSION (ML) NASAL
COMMUNITY

## 2024-12-30 RX ORDER — ERYTHROMYCIN 5 MG/G
OINTMENT OPHTHALMIC
COMMUNITY
Start: 2024-07-22

## 2024-12-30 RX ORDER — BUDESONIDE 3 MG/1
CAPSULE, COATED PELLETS ORAL
COMMUNITY
Start: 2024-06-25

## 2024-12-30 RX ORDER — COLESTIPOL HYDROCHLORIDE 1 G/1
TABLET ORAL
COMMUNITY
Start: 2024-02-21

## 2024-12-30 ASSESSMENT — ENCOUNTER SYMPTOMS
PERSON REPORTING PAIN: PATIENT
DENIES PAIN: 1

## 2024-12-30 ASSESSMENT — PAIN SCALES - PAIN ASSESSMENT IN ADVANCED DEMENTIA (PAINAD)
NEGVOCALIZATION: 0 - NONE.
NEGVOCALIZATION: 0
CONSOLABILITY: 0 - NO NEED TO CONSOLE.
FACIALEXPRESSION: 0
FACIALEXPRESSION: 0 - SMILING OR INEXPRESSIVE.
BODYLANGUAGE: 0 - RELAXED.
TOTALSCORE: 0
CONSOLABILITY: 0
BODYLANGUAGE: 0
BREATHING: 0

## 2024-12-31 ENCOUNTER — HOME CARE VISIT (OUTPATIENT)
Dept: HOME HEALTH SERVICES | Facility: HOME HEALTH | Age: 79
End: 2024-12-31
Payer: COMMERCIAL

## 2025-01-03 ASSESSMENT — ENCOUNTER SYMPTOMS
LOSS OF SENSATION IN FEET: 0
ANGER WITHIN DEFINED LIMITS: 1
DEPRESSION: 0
OCCASIONAL FEELINGS OF UNSTEADINESS: 0
AGGRESSION WITHIN DEFINED LIMITS: 1

## 2025-01-03 ASSESSMENT — ACTIVITIES OF DAILY LIVING (ADL)
HOME_HEALTH_OASIS: 00
OASIS_M1830: 01

## 2025-01-06 ENCOUNTER — HOME CARE VISIT (OUTPATIENT)
Dept: HOME HEALTH SERVICES | Facility: HOME HEALTH | Age: 80
End: 2025-01-06
Payer: MEDICARE

## 2025-01-06 PROCEDURE — G0153 HHCP-SVS OF S/L PATH,EA 15MN: HCPCS | Mod: HHH

## 2025-01-06 PROCEDURE — 169592 NO-PAY CLAIM PROCEDURE

## 2025-01-07 VITALS — HEART RATE: 76 BPM | OXYGEN SATURATION: 97 %

## 2025-01-07 ASSESSMENT — ENCOUNTER SYMPTOMS
ANGER WITHIN DEFINED LIMITS: 1
PAIN: 1
SUBJECTIVE PAIN PROGRESSION: GRADUALLY IMPROVING
PAIN LOCATION - PAIN SEVERITY: 3/10
PAIN LOCATION: BACK
PERSON REPORTING PAIN: PATIENT
DEPRESSION: 0
LOSS OF SENSATION IN FEET: 0
AGGRESSION WITHIN DEFINED LIMITS: 1
OCCASIONAL FEELINGS OF UNSTEADINESS: 1

## 2025-01-07 ASSESSMENT — ACTIVITIES OF DAILY LIVING (ADL)
CALENDAR_MANAGEMENT: SPOUSE MANAGES CALENDAR
ENTERING_EXITING_HOME: STAND BY ASSIST
OASIS_M1830: 02

## 2025-01-07 ASSESSMENT — PAIN SCALES - PAIN ASSESSMENT IN ADVANCED DEMENTIA (PAINAD)
CONSOLABILITY: 0
NEGVOCALIZATION: 0
BREATHING: 0
FACIALEXPRESSION: 0
BODYLANGUAGE: 0
NEGVOCALIZATION: 0 - NONE.
CONSOLABILITY: 0 - NO NEED TO CONSOLE.
TOTALSCORE: 0
FACIALEXPRESSION: 0 - SMILING OR INEXPRESSIVE.
BODYLANGUAGE: 0 - RELAXED.

## 2025-01-10 ENCOUNTER — HOSPITAL ENCOUNTER (OUTPATIENT)
Dept: RADIOLOGY | Facility: HOSPITAL | Age: 80
Discharge: HOME | End: 2025-01-10
Payer: MEDICARE

## 2025-01-10 DIAGNOSIS — I67.1 CEREBRAL ANEURYSM (HHS-HCC): ICD-10-CM

## 2025-01-10 PROCEDURE — 70450 CT HEAD/BRAIN W/O DYE: CPT

## 2025-01-13 ENCOUNTER — HOME CARE VISIT (OUTPATIENT)
Dept: HOME HEALTH SERVICES | Facility: HOME HEALTH | Age: 80
End: 2025-01-13
Payer: MEDICARE

## 2025-01-13 VITALS — OXYGEN SATURATION: 98 % | HEART RATE: 77 BPM

## 2025-01-13 PROCEDURE — G0153 HHCP-SVS OF S/L PATH,EA 15MN: HCPCS | Mod: HHH

## 2025-01-13 ASSESSMENT — PAIN SCALES - PAIN ASSESSMENT IN ADVANCED DEMENTIA (PAINAD)
BODYLANGUAGE: 0
NEGVOCALIZATION: 0 - NONE.
CONSOLABILITY: 0 - NO NEED TO CONSOLE.
CONSOLABILITY: 0
FACIALEXPRESSION: 0 - SMILING OR INEXPRESSIVE.
BREATHING: 0
NEGVOCALIZATION: 0
TOTALSCORE: 0
BODYLANGUAGE: 0 - RELAXED.
FACIALEXPRESSION: 0

## 2025-01-13 ASSESSMENT — ENCOUNTER SYMPTOMS
PAIN LOCATION: BACK
PAIN: 1
PERSON REPORTING PAIN: PATIENT
PAIN LOCATION - PAIN SEVERITY: 7/10

## 2025-01-14 ENCOUNTER — APPOINTMENT (OUTPATIENT)
Dept: NEUROSURGERY | Facility: HOSPITAL | Age: 80
End: 2025-01-14
Payer: COMMERCIAL

## 2025-01-20 ENCOUNTER — OFFICE VISIT (OUTPATIENT)
Dept: NEUROSURGERY | Facility: HOSPITAL | Age: 80
End: 2025-01-20
Payer: MEDICARE

## 2025-01-20 VITALS
SYSTOLIC BLOOD PRESSURE: 150 MMHG | DIASTOLIC BLOOD PRESSURE: 77 MMHG | WEIGHT: 92 LBS | HEART RATE: 69 BPM | RESPIRATION RATE: 17 BRPM | BODY MASS INDEX: 18.55 KG/M2 | HEIGHT: 59 IN

## 2025-01-20 DIAGNOSIS — I67.1 CEREBRAL ANEURYSM (HHS-HCC): Primary | ICD-10-CM

## 2025-01-20 PROCEDURE — 99213 OFFICE O/P EST LOW 20 MIN: CPT | Performed by: NEUROLOGICAL SURGERY

## 2025-01-20 ASSESSMENT — PAIN SCALES - GENERAL: PAINLEVEL_OUTOF10: 6

## 2025-01-20 NOTE — PROGRESS NOTES
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 Coiling was completed 27 years ago at [facility]. Last vascular imaging was completed in 2002 where she was noted to have coil artifact with lack of flow related enhancement in L ICA but noted a 1cm segment of flow in proximal ICA and 1.5cm segment flow in terminal ICA. No additional aneurysm noted at that time. She was recently evaluated by Dr. Negro for an elevate PTH of 135. Patient was recommended for CT/CTA imaging noted a parathyroid adenoma as well as a 5-6mm acomm aneurysm. Formal diagnostic angiogram with acomm aneurysm measuring 5x5 x 4.7m with 3.9mm. Patient is now s/p R craniotomy for aneurysm clipping 11/29/24. Postoperative course significant for as significant for headaches treated with dexamethasone. Patient was able to be discharged to home with PT/OT as insurance denied placement for acute rehab. Has had short term memory loss and continues to work with speech therapy. Patient presents to clinic for 6 week POV.       She denies headaches, weakness, numbness, seizures or any other symptoms. Her daughter reports that her memory and cognition are not back to baseline but she is improving.       Objective      Vitals:   /77   Pulse 69   Resp 17   Ht 1.499 m (4' 11\")   Wt (!) 41.7 kg (92 lb)   BMI 18.58 kg/m²         Physical Exam:    Awake, alert, oriented times 3.   EOM intact  Pupils are equal round and reactive  Intact facial sensation  No facial asymmetry  Intact hearing bilateraly  Midline tongue protrusion  Symmetric shoulder elevation  Symmetric head turning    5/5 in all extremities  No sensory deficits            Relevant " Results:    CTH 01/10/25:         CTA 24 w/wo: Postsurgical changes s/p clipping R acomm aneurysm without residual            Angiogram 10/17/24:                 Assessment & Plan      Diagnosis:  Cristina was seen today for post-op.  Diagnoses and all orders for this visit:  Cerebral aneurysm ([facility])          Provider Impression:     Mrs Rivera presents to clinic for follow-up evaluation from her right craniotomy for ACOMM clip ligation. She has been improving neurologically with some mild cognitive/memory difficulty. Her most recent head CT shows improving extra-axial collection. We discussed the need to see her PCP to increase her BP regimen. We also discussed checking a MRA head in 1 year for follow-up evaluation of her aneurysms.     Medical History     Past Medical History:   Diagnosis Date    Anemia     Aortic valve sclerosis     Arthritis     Asthma     Cataract     Cerebral aneurysm ([facility])     Colitis     GERD (gastroesophageal reflux disease)     Glaucoma     Hyperlipidemia     Hyperparathyroidism (Multi)     Hypertension     Parathyroid adenoma     Ulcerative colitis     Urinary tract infection      Past Surgical History:   Procedure Laterality Date    APPENDECTOMY  2016    Appendectomy    HYSTERECTOMY  2016    Hysterectomy    ROTATOR CUFF REPAIR  2016    Rotator Cuff Repair     Social History     Tobacco Use    Smoking status: Former     Current packs/day: 0.00     Types: Cigarettes     Quit date: 1974     Years since quittin.0    Smokeless tobacco: Former   Substance Use Topics    Alcohol use: Yes    Drug use: Never     Family History   Problem Relation Name Age of Onset    Ovarian cancer Mother      Ulcers Father      Cancer Sister      Brain Aneurysm Brother       Allergies   Allergen Reactions    Codeine GI Upset    Dilantin [Phenytoin Sodium Extended] Unknown    Latex Unknown     local swelling and itching with tapes and bandages    Colestipol Unknown     Nausea,  diarrhea     Current Outpatient Medications   Medication Instructions    albuterol 90 mcg/actuation inhaler Inhale.    alendronate (FOSAMAX) 70 mg    amLODIPine (Norvasc) 2.5 mg tablet Take by mouth.    atorvastatin (LIPITOR) 10 mg, Daily    azithromycin (Zithromax) 250 mg tablet Take by mouth.    benzonatate (Tessalon) 100 mg capsule Take by mouth.    Breo Ellipta 100-25 mcg/dose inhaler 2 PUFF TWICE A DAY FOR YOUR LUNGS.    brinzolamide (Azopt) 1 % ophthalmic suspension     budesonide EC (Entocort EC) 3 mg 24 hr capsule Date: 6/25/24 1:13:00 PM EDT    budesonide-formoteroL (Symbicort) 160-4.5 mcg/actuation inhaler INHALE 2 PUFFS BY MOUTH TWICE A DAY FOR YOUR LUNGS    calcitonin, salmon, (Miacalcin) 200 unit/actuation nasal spray Date: 6/25/24 1:13:00 PM EDT    carvedilol (COREG) 12.5 mg, 2 times daily    cetirizine (ZyrTEC) 10 mg chewable tablet     colesevelam (WELCHOL) 1,875 mg, oral, Daily, Takes #3 tablets once daily at lunch    colestipol (Colestid) 1 gram tablet TAKE 1 TABLET BY MOUTH TWO TIMES A DAY. TAKE WITH A MEAL.    cyclobenzaprine (Flexeril) 10 mg tablet TAKE 01/2-1 TABLETS BY MOUTH TWO TIMES A DAY AS NEEDED.    dexAMETHasone (DECADRON) 2 mg, oral, Every 12 hours scheduled    dorzolamide (Trusopt) 2 % ophthalmic solution 1 drop, 2 times daily    erythromycin (Romycin) 5 mg/gram (0.5 %) ophthalmic ointment APPLY A SMALL AMOUNT ON EYELID THREE TIMES A DAY FOR ONE WEEK FOLLOWING PROCEDURE    famotidine (PEPCID) 20 mg, 2 times daily    fluticasone (Flonase) 50 mcg/actuation nasal spray     fluticasone propion-salmeteroL (Advair) 230-21 mcg/actuation inhaler 1 puff, 2 times daily RT    furosemide (LASIX) 20 mg, Daily PRN    ibandronate (Boniva) 150 mg tablet     latanoprost (Xalatan) 0.005 % ophthalmic solution 1 drop, Nightly    Lidocaine Pain Relief 4 % patch APPLY 1 PATCH DAILY AS DIRECTED FOR 5 DAYS REMOVE PATCH AFTER 12 HOURS    lisinopril 10 mg tablet     losartan (COZAAR) 50 mg, Daily    magnesium  citrate solution TAKE 296 ML BY MOUTH ONE TIME ONLY FOR 1 DOSE.    nortriptyline (Pamelor) 25 mg capsule Take by mouth.    ondansetron (Zofran) 4 mg tablet TAKE 1 TABLET BY MOUTH EVERY 6 HOURS AS NEEDED FOR NAUSEA AND VOMITING FOR UP TO 7 DAYS    oxyCODONE-acetaminophen (Percocet) 5-325 mg tablet 1 tablet, Every 6 hours PRN    pantoprazole (PROTONIX) 40 mg, oral, Daily before breakfast, Do not crush, chew, or split.    Paxlovid 300 mg (150 mg x 2)-100 mg tablet therapy pack TAKE 3 TABLETS BY MOUTH TWICE A DAY FOR 5 DAYS . DON'T TAKE ATORVASTATIN AND TAMSULOSIN FOR 8 DAYS    polyethylene glycol (GLYCOLAX, MIRALAX) 17 g, oral, Daily, Dissolve into 8 oz of liquid    potassium chloride CR (Klor-Con) 8 mEq ER tablet 8 mEq, Daily    tamsulosin (FLOMAX) 0.4 mg, Daily    trimethoprim (TRIMPEX) 100 mg, Daily

## 2025-01-21 ENCOUNTER — HOME CARE VISIT (OUTPATIENT)
Dept: HOME HEALTH SERVICES | Facility: HOME HEALTH | Age: 80
End: 2025-01-21
Payer: MEDICARE

## 2025-01-21 PROCEDURE — G0153 HHCP-SVS OF S/L PATH,EA 15MN: HCPCS | Mod: HHH

## 2025-01-27 ENCOUNTER — HOME CARE VISIT (OUTPATIENT)
Dept: HOME HEALTH SERVICES | Facility: HOME HEALTH | Age: 80
End: 2025-01-27
Payer: MEDICARE

## 2025-02-03 ENCOUNTER — HOME CARE VISIT (OUTPATIENT)
Dept: HOME HEALTH SERVICES | Facility: HOME HEALTH | Age: 80
End: 2025-02-03
Payer: MEDICARE

## 2025-02-03 VITALS — OXYGEN SATURATION: 98 % | HEART RATE: 77 BPM

## 2025-02-03 PROCEDURE — G0153 HHCP-SVS OF S/L PATH,EA 15MN: HCPCS | Mod: HHH

## 2025-02-03 ASSESSMENT — PAIN SCALES - PAIN ASSESSMENT IN ADVANCED DEMENTIA (PAINAD)
BREATHING: 0
NEGVOCALIZATION: 0
CONSOLABILITY: 0 - NO NEED TO CONSOLE.
FACIALEXPRESSION: 0
NEGVOCALIZATION: 0 - NONE.
BODYLANGUAGE: 0 - RELAXED.
TOTALSCORE: 0
CONSOLABILITY: 0
BODYLANGUAGE: 0
FACIALEXPRESSION: 0 - SMILING OR INEXPRESSIVE.

## 2025-02-03 ASSESSMENT — ENCOUNTER SYMPTOMS
AGGRESSION WITHIN DEFINED LIMITS: 1
DENIES PAIN: 1
ANGER WITHIN DEFINED LIMITS: 1
PERSON REPORTING PAIN: PATIENT

## 2025-02-10 ENCOUNTER — HOME CARE VISIT (OUTPATIENT)
Dept: HOME HEALTH SERVICES | Facility: HOME HEALTH | Age: 80
End: 2025-02-10
Payer: MEDICARE

## 2025-02-10 VITALS — HEART RATE: 70 BPM | OXYGEN SATURATION: 99 %

## 2025-02-10 PROCEDURE — G0153 HHCP-SVS OF S/L PATH,EA 15MN: HCPCS | Mod: HHH

## 2025-02-10 ASSESSMENT — PAIN SCALES - PAIN ASSESSMENT IN ADVANCED DEMENTIA (PAINAD)
NEGVOCALIZATION: 0 - NONE.
FACIALEXPRESSION: 0 - SMILING OR INEXPRESSIVE.
NEGVOCALIZATION: 0
CONSOLABILITY: 0 - NO NEED TO CONSOLE.
TOTALSCORE: 0
FACIALEXPRESSION: 0
BREATHING: 0
BODYLANGUAGE: 0
CONSOLABILITY: 0
BODYLANGUAGE: 0 - RELAXED.

## 2025-02-10 ASSESSMENT — ENCOUNTER SYMPTOMS
PERSON REPORTING PAIN: PATIENT
PAIN: 1
PAIN LOCATION: BACK
PAIN LOCATION - PAIN SEVERITY: 5/10

## 2025-02-18 ENCOUNTER — HOME CARE VISIT (OUTPATIENT)
Dept: HOME HEALTH SERVICES | Facility: HOME HEALTH | Age: 80
End: 2025-02-18
Payer: MEDICARE

## 2025-02-18 VITALS — HEART RATE: 76 BPM | OXYGEN SATURATION: 97 %

## 2025-02-18 PROCEDURE — G0153 HHCP-SVS OF S/L PATH,EA 15MN: HCPCS | Mod: HHH

## 2025-02-18 ASSESSMENT — PAIN SCALES - PAIN ASSESSMENT IN ADVANCED DEMENTIA (PAINAD)
CONSOLABILITY: 0 - NO NEED TO CONSOLE.
FACIALEXPRESSION: 0 - SMILING OR INEXPRESSIVE.
BODYLANGUAGE: 0 - RELAXED.
BODYLANGUAGE: 0
NEGVOCALIZATION: 0 - NONE.
TOTALSCORE: 0
FACIALEXPRESSION: 0
CONSOLABILITY: 0
NEGVOCALIZATION: 0
BREATHING: 0

## 2025-02-18 ASSESSMENT — ENCOUNTER SYMPTOMS
PAIN LOCATION - PAIN SEVERITY: 7/10
PAIN LOCATION: RIGHT HIP
PAIN LOCATION - PAIN SEVERITY: 7/10
PAIN: 1
PERSON REPORTING PAIN: PATIENT
PAIN LOCATION: RIGHT LEG

## 2025-02-25 ENCOUNTER — HOME CARE VISIT (OUTPATIENT)
Dept: HOME HEALTH SERVICES | Facility: HOME HEALTH | Age: 80
End: 2025-02-25
Payer: MEDICARE

## 2025-02-25 VITALS — OXYGEN SATURATION: 97 % | HEART RATE: 80 BPM

## 2025-02-25 PROCEDURE — G0153 HHCP-SVS OF S/L PATH,EA 15MN: HCPCS | Mod: HHH

## 2025-02-25 ASSESSMENT — ENCOUNTER SYMPTOMS
PAIN: 1
PERSON REPORTING PAIN: PATIENT
PAIN LOCATION: RIGHT HIP
PAIN LOCATION - PAIN SEVERITY: 3/10

## 2025-02-25 ASSESSMENT — PAIN SCALES - PAIN ASSESSMENT IN ADVANCED DEMENTIA (PAINAD)
NEGVOCALIZATION: 0
CONSOLABILITY: 0
BODYLANGUAGE: 0
BREATHING: 0
TOTALSCORE: 0
CONSOLABILITY: 0 - NO NEED TO CONSOLE.
FACIALEXPRESSION: 0 - SMILING OR INEXPRESSIVE.
FACIALEXPRESSION: 0
BODYLANGUAGE: 0 - RELAXED.
NEGVOCALIZATION: 0 - NONE.

## 2025-03-04 ENCOUNTER — HOME CARE VISIT (OUTPATIENT)
Dept: HOME HEALTH SERVICES | Facility: HOME HEALTH | Age: 80
End: 2025-03-04
Payer: MEDICARE

## 2025-03-04 VITALS — HEART RATE: 92 BPM | OXYGEN SATURATION: 100 %

## 2025-03-04 PROCEDURE — G0153 HHCP-SVS OF S/L PATH,EA 15MN: HCPCS | Mod: HHH

## 2025-03-04 ASSESSMENT — PAIN SCALES - PAIN ASSESSMENT IN ADVANCED DEMENTIA (PAINAD)
BODYLANGUAGE: 0 - RELAXED.
NEGVOCALIZATION: 0
CONSOLABILITY: 0
FACIALEXPRESSION: 0
CONSOLABILITY: 0 - NO NEED TO CONSOLE.
BODYLANGUAGE: 0
FACIALEXPRESSION: 0 - SMILING OR INEXPRESSIVE.
NEGVOCALIZATION: 0 - NONE.
BREATHING: 0
TOTALSCORE: 0

## 2025-03-04 ASSESSMENT — ENCOUNTER SYMPTOMS
ANGER WITHIN DEFINED LIMITS: 1
DEPRESSION: 0
PERSON REPORTING PAIN: PATIENT
OCCASIONAL FEELINGS OF UNSTEADINESS: 0
AGGRESSION WITHIN DEFINED LIMITS: 1
DENIES PAIN: 1
LOSS OF SENSATION IN FEET: 0

## 2025-03-04 ASSESSMENT — ACTIVITIES OF DAILY LIVING (ADL)
OASIS_M1830: 00
HOME_HEALTH_OASIS: 00

## (undated) DEVICE — SYRINGE, HYPODERMIC, TB, W/O NEEDLE, 1 CC, SLIP TIP

## (undated) DEVICE — BAG, DECANTER

## (undated) DEVICE — DRESSING, TRANSPARENT, TEGADERM, 2-3/8 X 2-3/4 IN

## (undated) DEVICE — SUTURE, NUROLON, 4-0, 18 IN, TF, CONTROL RELEASE, MULTIPACK, BLACK

## (undated) DEVICE — APPLICATOR, CHLORAPREP, W/ORANGE TINT, 26ML

## (undated) DEVICE — TAPE, SILK, DURAPORE, 3 IN X 10 YD, LF

## (undated) DEVICE — DRAIN, WOUND, ROUND, W/TROCAR, HOLE PATTERN, 10 IN, MEDIUM, 1/8 X 49 IN

## (undated) DEVICE — BUR, ACORN 9MM PRECISION

## (undated) DEVICE — DRESSING, GAUZE, PETROLATUM, PATCH, XEROFORM, 1 X 8 IN, STERILE

## (undated) DEVICE — Device

## (undated) DEVICE — ELECTRODE, CORKSCREW NEEDLE 1.5M LENGTH

## (undated) DEVICE — TUBING, SMOKE EVAC, 3/8 X 10 FT

## (undated) DEVICE — GOWN, SURGICAL, SMARTGOWN, XLARGE, STERILE

## (undated) DEVICE — SEALANT, HEMOSTATIC, FLOSEAL, 10 ML

## (undated) DEVICE — SPONGE, HEMOSTATIC, GELATIN, SURGIFOAM, 8 X 12.5 CM X 10 MM

## (undated) DEVICE — SEALANT, DURASEAL, 5ML

## (undated) DEVICE — PROBE, DOPPLER, NEUROSURGERY, DISPOSABLE

## (undated) DEVICE — SPONGE GAUZE, XRAY SC+RFID, 4X4 16 PLY, STERILE

## (undated) DEVICE — CANNULA, 27G X 22MM, ANTERIOR, CHAMBER, RYCROFT

## (undated) DEVICE — BUR, ROUTER BIT, FA2, TAPERED, 2.3MM

## (undated) DEVICE — TUBING, SUCTION, CONNECTING, STERILE 0.25 X 120 IN., LF

## (undated) DEVICE — SPONGE, HEMOSTATIC, CELLULOSE, SURGICEL, 2 X 14 IN

## (undated) DEVICE — SPONGE, LAP, XRAY DECT, SC+RFID, 12X12, STERILE

## (undated) DEVICE — COVER PROBE, SOFT FLEX W/ GEL, 5 X 48 IN (13X122CM)

## (undated) DEVICE — SUTURE, SILK, 2-0, 18 IN, BLACK

## (undated) DEVICE — CLIP, ANEURYSM YASARGIL FT280T: Type: IMPLANTABLE DEVICE | Site: BRAIN | Status: NON-FUNCTIONAL

## (undated) DEVICE — DRAINAGE, MONITORING SYSTEM, EXTERNAL CSF

## (undated) DEVICE — TUBING, SUCTION, MICROSURGICAL, MICROVAC, 5 FR

## (undated) DEVICE — BAG, DRAINAGE, LIMITORR, VOLUME LIMITING EVD 20ML

## (undated) DEVICE — GEL, ULTRASOUND, AQUASONIC 100, 20 GM, STERILE

## (undated) DEVICE — REST, HEAD, BAGEL, 9 IN

## (undated) DEVICE — COVER, CART, 45 X 27 X 48 IN, CLEAR

## (undated) DEVICE — CATHETER KIT, DRAINAGE, LUMBAR EXTERNAL, 80CM, OPEN TIP

## (undated) DEVICE — LOOP, VESSEL, MAXI, RED

## (undated) DEVICE — DRAPE PACK, CRANIOTOMY, CUSTOM, UHC

## (undated) DEVICE — RETRACTOR, HOOK, FISH, NEURO

## (undated) DEVICE — BUR, 3MM X 3.8MM, PRECISION, NEURO DRILL

## (undated) DEVICE — NEEDLE, ELECTRODE, SUBDERMAL, PAIRED, 2.0 LEAD, DISP

## (undated) DEVICE — BLADE, SAW, OSCILLATING/SAGITTAL, 9 X 18.5 X 0.51 MM, STERILE

## (undated) DEVICE — PAD, GROUNDING, ELECTROSURGICAL, W/9 FT CABLE, POLYHESIVE II, ADULT, LF

## (undated) DEVICE — COUNTER, NEEDLE, FOAM BLOCK, W/MAGNET, W/BLADE GUARD, 10 COUNT, RED, LF

## (undated) DEVICE — SUTURE, POLYSORB, 2-0 UNDYED

## (undated) DEVICE — MARKER, SKIN, RULER AND LABEL PACK, CUSTOM

## (undated) DEVICE — CATHETER TRAY, SURESTEP, 16FR, URINE METER W/STATLOCK

## (undated) DEVICE — SPONGE, HEMOSTAT, SURGICEL FIBRILLAR, ABS, 4 X 4, LF

## (undated) DEVICE — DRAPE, SMARTDRAPE, FOR TIVATO MICROSCOPE

## (undated) DEVICE — BUR, STRAIGHT ROUTER

## (undated) DEVICE — COVER, TABLE, 44 X 75 IN, DISPOSABLE, LF, STERILE

## (undated) DEVICE — BOWL, BASIN, 32 OZ, STERILE

## (undated) DEVICE — ELECTRODE, GROUND PLATE

## (undated) DEVICE — MANIFOLD, 4 PORT NEPTUNE STANDARD

## (undated) DEVICE — APPLICATOR, PREP, CHLORAPREP, W/ORANGE TINT, 10.5ML